# Patient Record
Sex: FEMALE | Race: WHITE | NOT HISPANIC OR LATINO | Employment: OTHER | URBAN - METROPOLITAN AREA
[De-identification: names, ages, dates, MRNs, and addresses within clinical notes are randomized per-mention and may not be internally consistent; named-entity substitution may affect disease eponyms.]

---

## 2017-04-24 RX ORDER — FUROSEMIDE 20 MG/1
20 TABLET ORAL DAILY
Qty: 90 TABLET | Refills: 2 | Status: SHIPPED | OUTPATIENT
Start: 2017-04-24 | End: 2017-09-27 | Stop reason: SDUPTHER

## 2017-09-27 ENCOUNTER — HOSPITAL ENCOUNTER (OUTPATIENT)
Dept: CARDIOLOGY | Facility: HOSPITAL | Age: 77
Discharge: HOME | End: 2017-09-27
Attending: INTERNAL MEDICINE | Admitting: INTERNAL MEDICINE

## 2017-09-27 ENCOUNTER — OFFICE VISIT (OUTPATIENT)
Dept: CARDIOLOGY | Facility: CLINIC | Age: 77
End: 2017-09-27

## 2017-09-27 VITALS
BODY MASS INDEX: 25.71 KG/M2 | HEIGHT: 64 IN | HEART RATE: 85 BPM | DIASTOLIC BLOOD PRESSURE: 96 MMHG | SYSTOLIC BLOOD PRESSURE: 137 MMHG | WEIGHT: 150.6 LBS

## 2017-09-27 VITALS — BODY MASS INDEX: 25.61 KG/M2 | HEIGHT: 64 IN | WEIGHT: 150 LBS

## 2017-09-27 DIAGNOSIS — I10 ESSENTIAL HYPERTENSION: Primary | ICD-10-CM

## 2017-09-27 DIAGNOSIS — E78.2 MIXED HYPERLIPIDEMIA: ICD-10-CM

## 2017-09-27 DIAGNOSIS — I06.1 RHEUMATIC AORTIC VALVE INSUFFICIENCY: ICD-10-CM

## 2017-09-27 DIAGNOSIS — I05.1 RHEUMATIC MITRAL REGURGITATION: ICD-10-CM

## 2017-09-27 DIAGNOSIS — R07.89 OTHER CHEST PAIN: Primary | ICD-10-CM

## 2017-09-27 PROBLEM — I35.1 NONRHEUMATIC AORTIC VALVE INSUFFICIENCY: Status: ACTIVE | Noted: 2017-09-27

## 2017-09-27 PROCEDURE — 99214 OFFICE O/P EST MOD 30 MIN: CPT | Performed by: INTERNAL MEDICINE

## 2017-09-27 PROCEDURE — 93306 TTE W/DOPPLER COMPLETE: CPT | Performed by: INTERNAL MEDICINE

## 2017-09-27 PROCEDURE — 93306 TTE W/DOPPLER COMPLETE: CPT

## 2017-09-27 RX ORDER — FUROSEMIDE 20 MG/1
20 TABLET ORAL DAILY
Qty: 90 TABLET | Refills: 3 | Status: SHIPPED | OUTPATIENT
Start: 2017-09-27 | End: 2018-10-02 | Stop reason: SDUPTHER

## 2017-09-28 LAB
BH CV ECHO MEAS - AI DEC SLOPE: 501.7 CM/SEC^2
BH CV ECHO MEAS - AI MAX PG: 75.4 MMHG
BH CV ECHO MEAS - AI MAX VEL: 434 CM/SEC
BH CV ECHO MEAS - AI P1/2T: 253.4 MSEC
BH CV ECHO MEAS - AO MAX PG (FULL): 3.6 MMHG
BH CV ECHO MEAS - AO MAX PG: 7.3 MMHG
BH CV ECHO MEAS - AO MEAN PG (FULL): 1.7 MMHG
BH CV ECHO MEAS - AO MEAN PG: 3.7 MMHG
BH CV ECHO MEAS - AO ROOT AREA (BSA CORRECTED): 1.8
BH CV ECHO MEAS - AO ROOT AREA: 7.1 CM^2
BH CV ECHO MEAS - AO ROOT DIAM: 3 CM
BH CV ECHO MEAS - AO V2 MAX: 134.7 CM/SEC
BH CV ECHO MEAS - AO V2 MEAN: 88.7 CM/SEC
BH CV ECHO MEAS - AO V2 VTI: 30.3 CM
BH CV ECHO MEAS - AVA(I,A): 2.4 CM^2
BH CV ECHO MEAS - AVA(I,D): 2.4 CM^2
BH CV ECHO MEAS - AVA(V,A): 2.2 CM^2
BH CV ECHO MEAS - AVA(V,D): 2.2 CM^2
BH CV ECHO MEAS - BSA(HAYCOCK): 1.8 M^2
BH CV ECHO MEAS - BSA: 1.7 M^2
BH CV ECHO MEAS - BZI_BMI: 26.6 KILOGRAMS/M^2
BH CV ECHO MEAS - BZI_METRIC_HEIGHT: 160 CM
BH CV ECHO MEAS - BZI_METRIC_WEIGHT: 68 KG
BH CV ECHO MEAS - CONTRAST EF (2CH): 69.2 ML/M^2
BH CV ECHO MEAS - CONTRAST EF 4CH: 60 ML/M^2
BH CV ECHO MEAS - EDV(CUBED): 55.3 ML
BH CV ECHO MEAS - EDV(MOD-SP2): 78 ML
BH CV ECHO MEAS - EDV(MOD-SP4): 70 ML
BH CV ECHO MEAS - EDV(TEICH): 62.3 ML
BH CV ECHO MEAS - EF(CUBED): 70.7 %
BH CV ECHO MEAS - EF(MOD-SP2): 69.2 %
BH CV ECHO MEAS - EF(MOD-SP4): 55 %
BH CV ECHO MEAS - EF(TEICH): 63.1 %
BH CV ECHO MEAS - ESV(CUBED): 16.2 ML
BH CV ECHO MEAS - ESV(MOD-SP2): 24 ML
BH CV ECHO MEAS - ESV(MOD-SP4): 28 ML
BH CV ECHO MEAS - ESV(TEICH): 23 ML
BH CV ECHO MEAS - FS: 33.6 %
BH CV ECHO MEAS - IVS/LVPW: 0.73
BH CV ECHO MEAS - IVSD: 0.58 CM
BH CV ECHO MEAS - LA DIMENSION: 2.6 CM
BH CV ECHO MEAS - LA/AO: 0.87
BH CV ECHO MEAS - LAT PEAK E' VEL: 9.8 CM/SEC
BH CV ECHO MEAS - LV DIASTOLIC VOL/BSA (35-75): 40.9 ML/M^2
BH CV ECHO MEAS - LV MASS(C)D: 70 GRAMS
BH CV ECHO MEAS - LV MASS(C)DI: 40.9 GRAMS/M^2
BH CV ECHO MEAS - LV MAX PG: 3.7 MMHG
BH CV ECHO MEAS - LV MEAN PG: 2 MMHG
BH CV ECHO MEAS - LV SYSTOLIC VOL/BSA (12-30): 16.4 ML/M^2
BH CV ECHO MEAS - LV V1 MAX: 95.9 CM/SEC
BH CV ECHO MEAS - LV V1 MEAN: 64.9 CM/SEC
BH CV ECHO MEAS - LV V1 VTI: 23.3 CM
BH CV ECHO MEAS - LVIDD: 3.8 CM
BH CV ECHO MEAS - LVIDS: 2.5 CM
BH CV ECHO MEAS - LVLD AP2: 7.9 CM
BH CV ECHO MEAS - LVLD AP4: 7.8 CM
BH CV ECHO MEAS - LVLS AP2: 6.1 CM
BH CV ECHO MEAS - LVLS AP4: 5.9 CM
BH CV ECHO MEAS - LVOT AREA (M): 3.1 CM^2
BH CV ECHO MEAS - LVOT AREA: 3.1 CM^2
BH CV ECHO MEAS - LVOT DIAM: 2 CM
BH CV ECHO MEAS - LVPWD: 0.79 CM
BH CV ECHO MEAS - MED PEAK E' VEL: 8.27 CM/SEC
BH CV ECHO MEAS - MV A MAX VEL: 129 CM/SEC
BH CV ECHO MEAS - MV E MAX VEL: 94.1 CM/SEC
BH CV ECHO MEAS - MV E/A: 0.73
BH CV ECHO MEAS - PA ACC SLOPE: 409 CM/SEC^2
BH CV ECHO MEAS - PA ACC TIME: 0.13 SEC
BH CV ECHO MEAS - PA PR(ACCEL): 18.7 MMHG
BH CV ECHO MEAS - RAP SYSTOLE: 3 MMHG
BH CV ECHO MEAS - RVDD: 2.8 CM
BH CV ECHO MEAS - RVSP: 28 MMHG
BH CV ECHO MEAS - SI(AO): 125 ML/M^2
BH CV ECHO MEAS - SI(CUBED): 22.9 ML/M^2
BH CV ECHO MEAS - SI(LVOT): 42.8 ML/M^2
BH CV ECHO MEAS - SI(MOD-SP2): 31.6 ML/M^2
BH CV ECHO MEAS - SI(MOD-SP4): 24.5 ML/M^2
BH CV ECHO MEAS - SI(TEICH): 23 ML/M^2
BH CV ECHO MEAS - SV(AO): 213.9 ML
BH CV ECHO MEAS - SV(CUBED): 39.1 ML
BH CV ECHO MEAS - SV(LVOT): 73.3 ML
BH CV ECHO MEAS - SV(MOD-SP2): 54 ML
BH CV ECHO MEAS - SV(MOD-SP4): 42 ML
BH CV ECHO MEAS - SV(TEICH): 39.3 ML
BH CV ECHO MEAS - TAPSE (>1.6): 2.5 CM2
BH CV ECHO MEAS - TR MAX VEL: 250 CM/SEC
BH CV VAS BP LEFT ARM: NORMAL MMHG
BH CV XLRA - TDI S': 12 CM/SEC
E/E' RATIO: 10.5
LEFT ATRIUM VOLUME INDEX: 25.1 ML/M2
LEFT ATRIUM VOLUME: 43 CM3
LV EF 2D ECHO EST: 60 %

## 2017-09-29 ENCOUNTER — TELEPHONE (OUTPATIENT)
Dept: CARDIOLOGY | Facility: CLINIC | Age: 77
End: 2017-09-29

## 2017-09-29 NOTE — TELEPHONE ENCOUNTER
Patient called about echo results.Informed her that her pumping function is normal and she does have some age related changes noted. She is fine to go on her trip and to f/u as scheduled.

## 2017-10-24 ENCOUNTER — LAB (OUTPATIENT)
Dept: LAB | Facility: HOSPITAL | Age: 77
End: 2017-10-24

## 2017-10-24 ENCOUNTER — TRANSCRIBE ORDERS (OUTPATIENT)
Dept: LAB | Facility: HOSPITAL | Age: 77
End: 2017-10-24

## 2017-10-24 DIAGNOSIS — Z01.812 PRE-OPERATIVE LABORATORY EXAMINATION: ICD-10-CM

## 2017-10-24 DIAGNOSIS — Z01.812 PRE-OPERATIVE LABORATORY EXAMINATION: Primary | ICD-10-CM

## 2017-10-24 LAB
ANION GAP SERPL CALCULATED.3IONS-SCNC: 5 MMOL/L (ref 3–11)
BUN BLD-MCNC: 12 MG/DL (ref 9–23)
BUN/CREAT SERPL: 15 (ref 7–25)
CALCIUM SPEC-SCNC: 9.7 MG/DL (ref 8.7–10.4)
CHLORIDE SERPL-SCNC: 106 MMOL/L (ref 99–109)
CO2 SERPL-SCNC: 31 MMOL/L (ref 20–31)
CREAT BLD-MCNC: 0.8 MG/DL (ref 0.6–1.3)
DEPRECATED RDW RBC AUTO: 43 FL (ref 37–54)
ERYTHROCYTE [DISTWIDTH] IN BLOOD BY AUTOMATED COUNT: 13.5 % (ref 11.3–14.5)
GFR SERPL CREATININE-BSD FRML MDRD: 70 ML/MIN/1.73
GLUCOSE BLD-MCNC: 84 MG/DL (ref 70–100)
HCT VFR BLD AUTO: 40.8 % (ref 34.5–44)
HGB BLD-MCNC: 13.1 G/DL (ref 11.5–15.5)
MCH RBC QN AUTO: 28.1 PG (ref 27–31)
MCHC RBC AUTO-ENTMCNC: 32.1 G/DL (ref 32–36)
MCV RBC AUTO: 87.4 FL (ref 80–99)
PLATELET # BLD AUTO: 286 10*3/MM3 (ref 150–450)
PMV BLD AUTO: 11.6 FL (ref 6–12)
POTASSIUM BLD-SCNC: 3.6 MMOL/L (ref 3.5–5.5)
RBC # BLD AUTO: 4.67 10*6/MM3 (ref 3.89–5.14)
SODIUM BLD-SCNC: 142 MMOL/L (ref 132–146)
WBC NRBC COR # BLD: 7.02 10*3/MM3 (ref 3.5–10.8)

## 2017-10-24 PROCEDURE — 36415 COLL VENOUS BLD VENIPUNCTURE: CPT

## 2017-10-24 PROCEDURE — 80048 BASIC METABOLIC PNL TOTAL CA: CPT | Performed by: SURGERY

## 2017-10-24 PROCEDURE — 85027 COMPLETE CBC AUTOMATED: CPT | Performed by: SURGERY

## 2017-10-30 ENCOUNTER — LAB REQUISITION (OUTPATIENT)
Dept: LAB | Facility: HOSPITAL | Age: 77
End: 2017-10-30

## 2017-10-30 DIAGNOSIS — D17.1 BENIGN LIPOMATOUS NEOPLASM OF SKIN AND SUBCUTANEOUS TISSUE OF TRUNK: ICD-10-CM

## 2017-10-30 PROCEDURE — 88304 TISSUE EXAM BY PATHOLOGIST: CPT | Performed by: SURGERY

## 2017-10-31 LAB
CYTO UR: NORMAL
LAB AP CASE REPORT: NORMAL
LAB AP CLINICAL INFORMATION: NORMAL
Lab: NORMAL
PATH REPORT.FINAL DX SPEC: NORMAL
PATH REPORT.GROSS SPEC: NORMAL

## 2018-04-23 ENCOUNTER — OFFICE VISIT (OUTPATIENT)
Dept: OBSTETRICS AND GYNECOLOGY | Facility: CLINIC | Age: 78
End: 2018-04-23

## 2018-04-23 ENCOUNTER — APPOINTMENT (OUTPATIENT)
Dept: LAB | Facility: HOSPITAL | Age: 78
End: 2018-04-23

## 2018-04-23 VITALS
BODY MASS INDEX: 24.41 KG/M2 | HEART RATE: 64 BPM | SYSTOLIC BLOOD PRESSURE: 118 MMHG | DIASTOLIC BLOOD PRESSURE: 66 MMHG | RESPIRATION RATE: 12 BRPM | HEIGHT: 64 IN | WEIGHT: 143 LBS | OXYGEN SATURATION: 97 %

## 2018-04-23 DIAGNOSIS — Z78.0 MENOPAUSE: Primary | ICD-10-CM

## 2018-04-23 DIAGNOSIS — N95.2 VAGINAL ATROPHY: ICD-10-CM

## 2018-04-23 DIAGNOSIS — R10.2 PELVIC PAIN: ICD-10-CM

## 2018-04-23 LAB
BILIRUB UR QL STRIP: NEGATIVE
CLARITY UR: CLEAR
COLOR UR: YELLOW
GLUCOSE UR STRIP-MCNC: NEGATIVE MG/DL
HGB UR QL STRIP.AUTO: NEGATIVE
KETONES UR QL STRIP: NEGATIVE
LEUKOCYTE ESTERASE UR QL STRIP.AUTO: NEGATIVE
NITRITE UR QL STRIP: NEGATIVE
PH UR STRIP.AUTO: 7 [PH] (ref 5–8)
PROT UR QL STRIP: NEGATIVE
SP GR UR STRIP: <=1.005 (ref 1–1.03)
UROBILINOGEN UR QL STRIP: NORMAL

## 2018-04-23 PROCEDURE — 99203 OFFICE O/P NEW LOW 30 MIN: CPT | Performed by: OBSTETRICS & GYNECOLOGY

## 2018-04-23 PROCEDURE — 81003 URINALYSIS AUTO W/O SCOPE: CPT | Performed by: OBSTETRICS & GYNECOLOGY

## 2018-04-23 RX ORDER — ESTRADIOL 0.1 MG/G
CREAM VAGINAL
Qty: 42.5 G | Refills: 2 | Status: SHIPPED | OUTPATIENT
Start: 2018-04-23 | End: 2019-10-23

## 2018-04-23 NOTE — PROGRESS NOTES
Chief Complaint   Patient presents with   • Gynecologic Exam       Jessie Perez is a 77 y.o. year old  presenting to be seen for the acute onset of pelvic pain and bladder pressure.  This patient had a vaginal hysterectomy performed by Dr. Orellana.  She has also had bilateral, modified radical mastectomies of the breast for carcinoma of the left breast.  She states that she has been lifting her .  She denies bulging from the vagina.  She has discomfort when she voids.  She denies bowel symptoms.  She denies vaginal burning    History   Sexual Activity   • Sexual activity: Defer     SCREENING TESTS  No mammograms or Pap tests  Year 2012   Age                         PAP                         HPV high risk                         Mammogram                         BHUPINDER score                         Breast MRI                         Lipids                         Vitamin D                         Colonoscopy                         DEXA  Frax (hip/any)                         Ovarian Screen                             No Additional Complaints Reported    The following portions of the patient's history were reviewed and updated as appropriate:vital signs and   She  does not have any pertinent problems on file.  She  has a past surgical history that includes Cardiac catheterization; Hysterectomy; and Breast biopsy.  Her family history includes Cancer in her father; Hypertension in her mother; Stroke in her father.  She  reports that she has never smoked. She has never used smokeless tobacco. She reports that she does not drink alcohol or use drugs.  Current Outpatient Prescriptions   Medication Sig Dispense Refill   • aspirin 325 MG tablet Take 325 mg by mouth daily.     • Calcium-Phosphorus-Vitamin D (CITRACAL +D3 PO) Take 1,000 mg by mouth daily.     • Cranberry 500 MG capsule Take 500 mg by  "mouth daily.     • furosemide (LASIX) 20 MG tablet Take 1 tablet by mouth Daily. 90 tablet 3   • Multiple Vitamins-Minerals (CENTRUM ADULTS PO) Take  by mouth daily.     • omeprazole (PriLOSEC) 20 MG capsule Take 20 mg by mouth daily.     • HYDROcodone-acetaminophen (NORCO) 5-325 MG per tablet Take 1 tablet by mouth Every 4-6 Hours As Needed for pain 15 tablet 0     No current facility-administered medications for this visit.      She is allergic to bactrim [sulfamethoxazole-trimethoprim]; ciprofloxacin; lortab [hydrocodone-acetaminophen]; and oxycontin [oxycodone hcl]..        Review of Systems  A comprehensive review of systems was done.  Constitutional: negative for fever, chills, activity change, appetite change, fatigue and unexpected weight change.  Respiratory: negative  Cardiovascular: positive for palpitations  Gastrointestinal: negative  Genitourinary:positive for dysuria and frequency  Musculoskeletal:negative  Behavioral/Psych: negative          /66   Pulse 64   Resp 12   Ht 161.3 cm (63.5\")   Wt 64.9 kg (143 lb)   SpO2 97%   BMI 24.93 kg/m²     Physical Exam    General:  alert; cooperative; well developed; well nourished   Skin:  No suspicious lesions seen   Thyroid: normal to inspection and palpation   Lungs:  clear to auscultation bilaterally   Heart:  RRR with II/VI systolic murmur along LSB   Breasts:  Examined in supine position  Absent bilaterally with no nick enlargement   Abdomen: soft, non-tender; no masses  no umbilical or inginual hernias are present  no hepato-splenomegaly   Pelvis: Clinical staff was present for exam  External genitalia:  normal appearance of the external genitalia including Bartholin's and [place]'s glands.  Vaginal:  atrophic mucosal changes are present;  Cervix:  absent.  Uterus:  absent.  Adnexa:  non palpable bilaterally.  Rectal:  anus visually normal appearing. recto-vaginal exam unremarkable and confirms findings;     Lab Review   No data " reviewed    Imaging   No data reviewed    Medical counseling was given to patient for the following topics: diagnostic results, instructions for management, risk factor reductions, impressions, risks and benefits of treatment options and importance of treatment compliance . Total time of the encounter was 32 minute(s) and 19 minute(s)  was spent in face to face counseling.  I have counseled the patient that she has evidence of vaginal atrophy and should be using Estrace vaginal cream in a dose of 0.5 g twice a week.  I have counseled the patient that her bladder is tender to palpation.  I have counseled her about the need for a catheterized urine specimen.  I have counseled her that if the urinalysis is normal she should see urology for follow-up.  I have counseled her to avoid heavy lifting and straining.  I have counseled her about regular walking and taking calcium with vitamin D.          ASSESSMENT  Problems Addressed this Visit        Genitourinary    Menopause - Primary      Other Visit Diagnoses     Pelvic pain        Relevant Orders    Urinalysis With / Culture If Indicated - Urine, Catheter           Vaginal atrophy      PLAN    · Medications prescribed this encounter:  No orders of the defined types were placed in this encounter.  · Estrace vaginal cream, 0.5 grams twice a week  · Cath U/A sent, If the urinalysis is normal I have commended that the patient see urology for bladder pain  · Follow up: 12 month(s)  · Calcium, 600 mg/ Vit. D, 400 IU daily     *Please note that portions of this documentation may have been completed with a voice recognition program.  Efforts were made to edit this dictation, but occasional words may have been mistranscribed.     This note was electronically signed.    LEIGHTON Allen MD  April 23, 2018  2:11 PM

## 2018-04-24 ENCOUNTER — TELEPHONE (OUTPATIENT)
Dept: OBSTETRICS AND GYNECOLOGY | Facility: CLINIC | Age: 78
End: 2018-04-24

## 2018-04-24 NOTE — TELEPHONE ENCOUNTER
"Pt notified that her insurance is requiring we change RX from Estrace Vag cream to Premarin but that the cost of the medication will be $363.88 per tube. Explained that the tube will last several months and cost will go toward her deductible, versus getting generic Estrace cream which would cost $5 less, but not go toward her med deductible. She voices understanding and is aggreeable.     I spoke with \"Magda\" pharmacist at [facility] - RX changed to Premarin vaginal Cream, insert 0.5 gram intravaginally TWO times a week at night. Refills x2.   "

## 2018-10-02 RX ORDER — FUROSEMIDE 20 MG/1
20 TABLET ORAL DAILY
Qty: 90 TABLET | Refills: 0 | Status: SHIPPED | OUTPATIENT
Start: 2018-10-02 | End: 2018-10-10 | Stop reason: SDUPTHER

## 2018-10-08 NOTE — PROGRESS NOTES
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    Chief Complaint   Patient presents with   • Rheumatic aortic valve insufficiency   • Hypertension       PROBLEM LIST:   1. Hypertension.   2. Hyperlipidemia.   3. VHD  1. 5/16 echo bileaflet MVP mod mr, mild AI nl lvef  2. 9/20/17 echo: EF 60%,  mild MR, mild AI  4. Rheumatic fever as a child.  5. Osteoporosis  6. Hiatal Hernia  7. Hx breast ca  8. CP- 4/16 MPS wnl  9. Surgical Hx  1. Double mastectomy  2. Cataract extraction 2017    Allergies  Allergies   Allergen Reactions   • Bactrim [Sulfamethoxazole-Trimethoprim] Angioedema   • Ciprofloxacin    • Lortab [Hydrocodone-Acetaminophen]    • Oxycontin [Oxycodone Hcl]        Current Medications    Current Outpatient Prescriptions:   •  aspirin 325 MG tablet, Take 325 mg by mouth daily., Disp: , Rfl:   •  Calcium-Phosphorus-Vitamin D (CITRACAL +D3 PO), Take 1,000 mg by mouth daily., Disp: , Rfl:   •  Cranberry 500 MG capsule, Take 500 mg by mouth daily., Disp: , Rfl:   •  estradiol (ESTRACE VAGINAL) 0.1 MG/GM vaginal cream, Insert 0.5 grams intravaginally 2 times each week, Disp: 42.5 g, Rfl: 2  •  furosemide (LASIX) 20 MG tablet, Take 1 tablet by mouth Daily., Disp: 90 tablet, Rfl: 0  •  meclizine (ANTIVERT) 12.5 MG tablet, Take 1 tablet by mouth As Needed., Disp: , Rfl:   •  Multiple Vitamins-Minerals (CENTRUM ADULTS PO), Take 1 tablet by mouth Daily., Disp: , Rfl:   •  omeprazole (PriLOSEC) 20 MG capsule, Take 20 mg by mouth daily., Disp: , Rfl:       History of Present Illness   Pt here for follow up. . She reports she feels these symptoms are related to stress of being her 's caretaker.  Has had substernal chest  "discomfort the clinic curve nocturnally she has taken aspirin with relief of such.  She's been on PPI for some time with out any heartburn symptoms.  She can notice occasionally with exercise doing housework as well.  ROS:  All systems have been reviewed and are negative with the exception of those mentioned in the HPI.    OBJECTIVE:  Vitals:    10/10/18 0927   BP: 148/82   BP Location: Left arm   Patient Position: Sitting   Pulse: 62   SpO2: 98%   Weight: 69.1 kg (152 lb 6.4 oz)   Height: 160 cm (63\")     Physical Exam   Constitutional: She appears well-developed and well-nourished.   Neck: Normal range of motion. Neck supple. No hepatojugular reflux and no JVD present. Carotid bruit is not present. No tracheal deviation present. No thyromegaly present.   Cardiovascular: Normal rate, regular rhythm, S1 normal, S2 normal, intact distal pulses and normal pulses.  PMI is not displaced.  Exam reveals no gallop, no distant heart sounds, no friction rub, no midsystolic click and no opening snap.    Murmur heard.  Pulses:       Radial pulses are 2+ on the right side, and 2+ on the left side.        Dorsalis pedis pulses are 2+ on the right side, and 2+ on the left side.        Posterior tibial pulses are 2+ on the right side, and 2+ on the left side.   Pulmonary/Chest: Effort normal and breath sounds normal. She has no wheezes. She has no rales.   Abdominal: Soft. Bowel sounds are normal. She exhibits no mass. There is no tenderness. There is no guarding.       Diagnostic Data:  Procedures      ASSESSMENT:   Diagnosis Plan   1. Angina pectoris (CMS/HCC)     2. Essential hypertension       PLAN:  1.  Anginal equivalent no recent ischemic evaluation risk stratification with exercise Cardiolite at her nearest convenience  2. Mildly elevated today with occasional lower readings at home. Pt instructed to check BP at home more often and if readings are averaging too high then would consider addition of antihypertensive " agent  3. Labs per PCP, continue risk factor reduction  4.  Lipid profile day of stress testing if not obtained recently per Dr. Stevie Hubbard, Stephan Alcantar MD, thank you for referring Ms. Perez for evaluation.  I have forwarded my electronically generated recommendations to you for review.  Please do not hesitate to call with any questions.    Scribed for aRo Wei MD by Megan Luo PA-C. 10/10/2018  9:44 AM   I, Rao Wei MD, personally performed the services described in this documentation as scribed by the above named individual in my presence, and it is both accurate and complete.  10/10/2018  9:44 AM    Rao Wei MD, FACC

## 2018-10-10 ENCOUNTER — OFFICE VISIT (OUTPATIENT)
Dept: CARDIOLOGY | Facility: CLINIC | Age: 78
End: 2018-10-10

## 2018-10-10 VITALS
DIASTOLIC BLOOD PRESSURE: 82 MMHG | SYSTOLIC BLOOD PRESSURE: 148 MMHG | HEART RATE: 62 BPM | BODY MASS INDEX: 27 KG/M2 | OXYGEN SATURATION: 98 % | WEIGHT: 152.4 LBS | HEIGHT: 63 IN

## 2018-10-10 DIAGNOSIS — I10 ESSENTIAL HYPERTENSION: ICD-10-CM

## 2018-10-10 DIAGNOSIS — I20.9 ANGINA PECTORIS (HCC): Primary | ICD-10-CM

## 2018-10-10 PROCEDURE — 99214 OFFICE O/P EST MOD 30 MIN: CPT | Performed by: INTERNAL MEDICINE

## 2018-10-10 RX ORDER — MECLIZINE HCL 12.5 MG/1
1 TABLET ORAL AS NEEDED
COMMUNITY
Start: 2018-07-24 | End: 2021-04-28

## 2018-10-10 RX ORDER — FUROSEMIDE 20 MG/1
20 TABLET ORAL DAILY
Qty: 90 TABLET | Refills: 0 | Status: SHIPPED | OUTPATIENT
Start: 2018-10-10 | End: 2019-06-17 | Stop reason: SDUPTHER

## 2018-10-23 ENCOUNTER — HOSPITAL ENCOUNTER (OUTPATIENT)
Dept: CARDIOLOGY | Facility: HOSPITAL | Age: 78
Discharge: HOME | End: 2018-10-23
Attending: INTERNAL MEDICINE

## 2018-10-23 VITALS — WEIGHT: 152.34 LBS | BODY MASS INDEX: 26.99 KG/M2 | HEIGHT: 63 IN

## 2018-10-23 DIAGNOSIS — I20.9 ANGINA PECTORIS (HCC): ICD-10-CM

## 2018-10-23 LAB
BH CV STRESS DURATION MIN STAGE 1: 1
BH CV STRESS DURATION SEC STAGE 1: 32
BH CV STRESS GRADE STAGE 1: 10
BH CV STRESS METS STAGE 1: 5
BH CV STRESS PROTOCOL 1: NORMAL
BH CV STRESS PROTOCOL 2 BP STAGE 2: NORMAL
BH CV STRESS PROTOCOL 2 BP STAGE 3: 105
BH CV STRESS PROTOCOL 2 BP STAGE 4: 103
BH CV STRESS PROTOCOL 2 COMMENTS STAGE 1: NORMAL
BH CV STRESS PROTOCOL 2 DOSE REGADENOSON STAGE 1: 0.4
BH CV STRESS PROTOCOL 2 DURATION MIN STAGE 1: 1
BH CV STRESS PROTOCOL 2 DURATION MIN STAGE 2: 1
BH CV STRESS PROTOCOL 2 DURATION MIN STAGE 3: 1
BH CV STRESS PROTOCOL 2 DURATION MIN STAGE 4: 1
BH CV STRESS PROTOCOL 2 DURATION SEC STAGE 1: 0
BH CV STRESS PROTOCOL 2 DURATION SEC STAGE 2: 0
BH CV STRESS PROTOCOL 2 DURATION SEC STAGE 3: 0
BH CV STRESS PROTOCOL 2 DURATION SEC STAGE 4: 0
BH CV STRESS PROTOCOL 2 HR STAGE 1: 117
BH CV STRESS PROTOCOL 2 HR STAGE 2: 110
BH CV STRESS PROTOCOL 2 HR STAGE 4: NORMAL
BH CV STRESS PROTOCOL 2 O2 STAGE 2: 99
BH CV STRESS PROTOCOL 2 O2 STAGE 3: 98
BH CV STRESS PROTOCOL 2 O2 STAGE 4: 98
BH CV STRESS PROTOCOL 2 STAGE 1: 1
BH CV STRESS PROTOCOL 2 STAGE 2: 2
BH CV STRESS PROTOCOL 2 STAGE 3: 3
BH CV STRESS PROTOCOL 2 STAGE 4: 4
BH CV STRESS PROTOCOL 2: NORMAL
BH CV STRESS RECOVERY BP: NORMAL MMHG
BH CV STRESS RECOVERY HR: 94 BPM
BH CV STRESS RECOVERY O2: 98 %
BH CV STRESS SPEED STAGE 1: 1.7
BH CV STRESS STAGE 1: 1
LV EF NUC BP: 74 %
MAXIMAL PREDICTED HEART RATE: 142 BPM
PERCENT MAX PREDICTED HR: 106.34 %
STRESS BASELINE BP: NORMAL MMHG
STRESS BASELINE HR: 71 BPM
STRESS O2 SAT REST: 97 %
STRESS PERCENT HR: 125 %
STRESS POST ESTIMATED WORKLOAD: 1 METS
STRESS POST EXERCISE DUR MIN: 4 MIN
STRESS POST EXERCISE DUR SEC: 0 SEC
STRESS POST O2 SAT PEAK: 99 %
STRESS POST PEAK BP: NORMAL MMHG
STRESS POST PEAK HR: 151 BPM
STRESS TARGET HR: 121 BPM

## 2018-10-23 PROCEDURE — 0 TECHNETIUM SESTAMIBI: Performed by: INTERNAL MEDICINE

## 2018-10-23 PROCEDURE — 93018 CV STRESS TEST I&R ONLY: CPT | Performed by: INTERNAL MEDICINE

## 2018-10-23 PROCEDURE — 78452 HT MUSCLE IMAGE SPECT MULT: CPT

## 2018-10-23 PROCEDURE — A9500 TC99M SESTAMIBI: HCPCS | Performed by: INTERNAL MEDICINE

## 2018-10-23 PROCEDURE — 25010000002 REGADENOSON 0.4 MG/5ML SOLUTION: Performed by: INTERNAL MEDICINE

## 2018-10-23 PROCEDURE — 93017 CV STRESS TEST TRACING ONLY: CPT

## 2018-10-23 PROCEDURE — 78452 HT MUSCLE IMAGE SPECT MULT: CPT | Performed by: INTERNAL MEDICINE

## 2018-10-23 RX ADMIN — TECHNETIUM TC 99M SESTAMIBI 1 DOSE: 1 INJECTION INTRAVENOUS at 11:10

## 2018-10-23 RX ADMIN — TECHNETIUM TC 99M SESTAMIBI 1 DOSE: 1 INJECTION INTRAVENOUS at 12:50

## 2018-10-23 RX ADMIN — REGADENOSON 0.4 MG: 0.08 INJECTION, SOLUTION INTRAVENOUS at 12:50

## 2018-10-26 ENCOUNTER — TELEPHONE (OUTPATIENT)
Dept: CARDIOLOGY | Facility: CLINIC | Age: 78
End: 2018-10-26

## 2018-10-26 NOTE — TELEPHONE ENCOUNTER
Spoke to patient reviewed stress results and informed her I will be mailing her a letter today as well. Patient verbalized understanding.

## 2019-05-08 RX ORDER — CONJUGATED ESTROGENS 0.62 MG/G
CREAM VAGINAL
Refills: 1 | OUTPATIENT
Start: 2019-05-08

## 2019-06-17 RX ORDER — FUROSEMIDE 20 MG/1
20 TABLET ORAL DAILY
Qty: 180 TABLET | Refills: 0 | Status: SHIPPED | OUTPATIENT
Start: 2019-06-17 | End: 2020-10-28 | Stop reason: SDUPTHER

## 2019-10-23 ENCOUNTER — OFFICE VISIT (OUTPATIENT)
Dept: CARDIOLOGY | Facility: CLINIC | Age: 79
End: 2019-10-23

## 2019-10-23 VITALS
OXYGEN SATURATION: 97 % | BODY MASS INDEX: 26.26 KG/M2 | SYSTOLIC BLOOD PRESSURE: 140 MMHG | HEIGHT: 64 IN | DIASTOLIC BLOOD PRESSURE: 72 MMHG | HEART RATE: 70 BPM | WEIGHT: 153.8 LBS

## 2019-10-23 DIAGNOSIS — R07.2 PRECORDIAL PAIN: Primary | ICD-10-CM

## 2019-10-23 DIAGNOSIS — E78.2 MIXED HYPERLIPIDEMIA: ICD-10-CM

## 2019-10-23 DIAGNOSIS — I10 ESSENTIAL HYPERTENSION: ICD-10-CM

## 2019-10-23 PROCEDURE — 99214 OFFICE O/P EST MOD 30 MIN: CPT | Performed by: INTERNAL MEDICINE

## 2019-10-23 RX ORDER — CHOLECALCIFEROL (VITAMIN D3) 125 MCG
500 CAPSULE ORAL DAILY
COMMUNITY
End: 2020-01-16

## 2019-10-23 RX ORDER — ASPIRIN 81 MG/1
81 TABLET ORAL DAILY
COMMUNITY

## 2019-10-23 NOTE — PROGRESS NOTES
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      PROBLEM LIST:   1. Hypertension.   2. Hyperlipidemia.   3. VHD  1. 5/16 echo bileaflet MVP mod mr, mild AI nl lvef  2. 9/20/17 echo: EF 60%,  mild MR, mild AI  4. CP  1. 10/23/2018 MPS: Lexiscan Cardiolite with moderate sized fixed apical defect with no reversibility EF>70%  5. Rheumatic fever as a child.  6. Osteoporosis  7. Hiatal Hernia  8. Hx breast ca  9. CP- 4/16 MPS wnl  10. Surgical Hx  1. Double mastectomy  2. Cataract extraction 2017      Chief Complaint   Patient presents with   • Chest Pain   • Essential hypertension   • Mitral regurgitation       Allergies  Allergies   Allergen Reactions   • Bactrim [Sulfamethoxazole-Trimethoprim] Angioedema   • Ciprofloxacin    • Lortab [Hydrocodone-Acetaminophen]    • Oxycontin [Oxycodone Hcl]        Current Medications    Current Outpatient Medications:   •  aspirin 81 MG EC tablet, Take 81 mg by mouth Daily., Disp: , Rfl:   •  Calcium-Phosphorus-Vitamin D (CITRACAL +D3 PO), Take 1,000 mg by mouth daily., Disp: , Rfl:   •  Cranberry 500 MG capsule, Take 500 mg by mouth daily., Disp: , Rfl:   •  furosemide (LASIX) 20 MG tablet, Take 1 tablet by mouth Daily. (Patient taking differently: Take 20 mg by mouth As Needed.), Disp: 180 tablet, Rfl: 0  •  meclizine (ANTIVERT) 12.5 MG tablet, Take 1 tablet by mouth As Needed., Disp: , Rfl:   •  Multiple Vitamins-Minerals (CENTRUM ADULTS PO), Take 1 tablet by mouth Daily., Disp: , Rfl:   •  omeprazole (PriLOSEC) 20 MG capsule, Take 20 mg by mouth daily., Disp: , Rfl:   •  vitamin B-12 (CYANOCOBALAMIN) 500 MCG tablet, Take 500 mcg by mouth Daily., Disp: , Rfl:       History of Present Illness   Jessie A  "Chris is a 79 y.o. year old female here for follow up.    She reports she continues to have midsternal CP daily. This can radiate to her back. She states it reminds her of having muscle spasms in her legs. Sometimes this occurs after eating, and she has reported some dysphagia as well. Last December she had EGD w esophageal dilation w [facility].      She does notice ARAIZA w moderate exertion. Her 's health is deteriorating, and she is not able to get formal exercise. Is having more help with her kids.     Pt denies any dyspnea at rest, orthopnea, PND, palpitations, lower extremity edema, or claudication.    Her BP has been controlled.      OBJECTIVE:  Vitals:    10/23/19 0932   BP: 140/72   BP Location: Left arm   Patient Position: Sitting   Pulse: 70   SpO2: 97%   Weight: 69.8 kg (153 lb 12.8 oz)   Height: 161.3 cm (63.5\")     Physical Exam   Constitutional: She is oriented to person, place, and time. She appears well-developed and well-nourished. No distress.   Cardiovascular: Normal rate, regular rhythm and intact distal pulses.   Murmur heard.  Pulses:       Radial pulses are 2+ on the right side, and 2+ on the left side.        Dorsalis pedis pulses are 2+ on the right side, and 2+ on the left side.        Posterior tibial pulses are 2+ on the right side, and 2+ on the left side.   Pulmonary/Chest: Effort normal and breath sounds normal. She has no wheezes. She has no rales.   Abdominal: Soft. Bowel sounds are normal. There is no tenderness. There is no guarding.   Musculoskeletal: She exhibits no edema or tenderness.   Neurological: She is alert and oriented to person, place, and time.   Skin: Skin is warm and dry. No rash noted.   Psychiatric: She has a normal mood and affect.   Nursing note and vitals reviewed.      Diagnostic Data:  Procedures      ASSESSMENT:   Diagnosis Plan   1. Precordial pain     2. Essential hypertension     3. Mixed hyperlipidemia         PLAN:  1. Low risk stress last " year. Agree with further GI w/u with her history. Also rec to try mylanta. Consider gallbladder u/s.  2. Patient has acceptable BP. Continue current medical management. Counseled to regularly check BP at home with goal averaging <130/80.   3. Labs per pcp, diet controlled     StevieStephan MD, thank you for referring Ms. Perez for evaluation.  I have forwarded my electronically generated recommendations to you for review.  Please do not hesitate to call with any questions.    Scribed for Rao Wei MD by Megan Luo PA-C. 10/23/2019  9:53 AM   Rao Wei MD, FACC

## 2019-12-23 ENCOUNTER — OUTSIDE FACILITY SERVICE (OUTPATIENT)
Dept: GASTROENTEROLOGY | Facility: CLINIC | Age: 79
End: 2019-12-23

## 2019-12-23 PROCEDURE — 45378 DIAGNOSTIC COLONOSCOPY: CPT | Performed by: INTERNAL MEDICINE

## 2019-12-26 DIAGNOSIS — R10.9 ABDOMINAL PAIN, UNSPECIFIED ABDOMINAL LOCATION: Primary | ICD-10-CM

## 2019-12-27 ENCOUNTER — HOSPITAL ENCOUNTER (OUTPATIENT)
Dept: CT IMAGING | Facility: HOSPITAL | Age: 79
Discharge: HOME | End: 2019-12-27
Admitting: INTERNAL MEDICINE

## 2019-12-27 DIAGNOSIS — R10.9 ABDOMINAL PAIN, UNSPECIFIED ABDOMINAL LOCATION: ICD-10-CM

## 2019-12-27 PROCEDURE — 74176 CT ABD & PELVIS W/O CONTRAST: CPT

## 2019-12-27 PROCEDURE — A9270 NON-COVERED ITEM OR SERVICE: HCPCS | Performed by: INTERNAL MEDICINE

## 2019-12-27 PROCEDURE — 63710000001 BARIUM 2 % SUSPENSION: Performed by: INTERNAL MEDICINE

## 2019-12-27 RX ADMIN — BARIUM SULFATE 450 ML: 21 SUSPENSION ORAL at 12:12

## 2020-01-02 ENCOUNTER — TELEPHONE (OUTPATIENT)
Dept: OBSTETRICS AND GYNECOLOGY | Facility: CLINIC | Age: 80
End: 2020-01-02

## 2020-01-02 NOTE — TELEPHONE ENCOUNTER
Patient calls today wondering if her appendix was taken out at the time of post partum bilateral segmental salpingectomy by Dr. Hauser?  I told the patient that there was no record of that procedure being done in her current record, and that it would have been unusual to remove her appendix at the time of a tubal.  I let her know that we could request her chart from storage if necessary, and she states that it should not be necessary at this time.  She has not seen Dr. Allen since 4/23/18.  She requested a prescription for Premarin vaginal cream.  I told her that she must see Dr. Allen before we could prescribe any medications for her.  She was scheduled to see Dr. Allen 1/16/2020.

## 2020-01-06 ENCOUNTER — OFFICE VISIT (OUTPATIENT)
Dept: GASTROENTEROLOGY | Facility: CLINIC | Age: 80
End: 2020-01-06

## 2020-01-06 VITALS
BODY MASS INDEX: 26.68 KG/M2 | SYSTOLIC BLOOD PRESSURE: 158 MMHG | HEART RATE: 61 BPM | DIASTOLIC BLOOD PRESSURE: 71 MMHG | WEIGHT: 153 LBS

## 2020-01-06 DIAGNOSIS — R10.30 LOWER ABDOMINAL PAIN: Primary | ICD-10-CM

## 2020-01-06 PROCEDURE — 99213 OFFICE O/P EST LOW 20 MIN: CPT | Performed by: INTERNAL MEDICINE

## 2020-01-06 NOTE — PROGRESS NOTES
PCP:  Stephan Hubbard MD     No referring provider defined for this encounter.    Chief Complaint   Patient presents with   • Abdominal Pain     follow up abdominal pain        HPI   Patient is known to me.  A 79-year-old who had a recent colonoscopy.  And some lower abdominal discomfort.  She had some fixation of the colon during colonoscopy.  There was also some diverticulosis with an area of white exudate concerning for diverticulitis.  There was no associated inflammation however.  A subsequent CAT scan was fairly unremarkable other than a 4 mm noncalcified nodule in the right middle lung field.  No diverticulitis was noted.  She had some worsening of her pain until about January 1.  Since that time her pain has almost completely dissipated.  She did have an issue with some constipation which is unusual for her.  She had that when the pain initially began.  Her bowels seem to have also returned.    Allergies   Allergen Reactions   • Bactrim [Sulfamethoxazole-Trimethoprim] Angioedema   • Ciprofloxacin    • Lortab [Hydrocodone-Acetaminophen]    • Oxycontin [Oxycodone Hcl]           Current Outpatient Medications:   •  aspirin 81 MG EC tablet, Take 81 mg by mouth Daily., Disp: , Rfl:   •  Calcium-Phosphorus-Vitamin D (CITRACAL +D3 PO), Take 1,000 mg by mouth daily., Disp: , Rfl:   •  Cranberry 500 MG capsule, Take 500 mg by mouth daily., Disp: , Rfl:   •  furosemide (LASIX) 20 MG tablet, Take 1 tablet by mouth Daily. (Patient taking differently: Take 20 mg by mouth As Needed.), Disp: 180 tablet, Rfl: 0  •  meclizine (ANTIVERT) 12.5 MG tablet, Take 1 tablet by mouth As Needed., Disp: , Rfl:   •  Multiple Vitamins-Minerals (CENTRUM ADULTS PO), Take 1 tablet by mouth Daily., Disp: , Rfl:   •  omeprazole (PriLOSEC) 20 MG capsule, Take 20 mg by mouth daily., Disp: , Rfl:   •  polyethylene glycol (GoLYTELY) 236 g solution, Follow Directions On Paperwork Mailed From Office. If You Have Questions Call [phone].,  Disp: 4000 mL, Rfl: 0  •  vitamin B-12 (CYANOCOBALAMIN) 500 MCG tablet, Take 500 mcg by mouth Daily., Disp: , Rfl:      Past Medical History:   Diagnosis Date   • Chest pain 9/7/2016   • History of echocardiogram     EKG done today shows normal sinus rhythm with no ST-T wave changes with a rate of 61 and a QTc of 404 msec.    • History of rheumatic fever 9/7/2016   • Hyperlipidemia 9/7/2016   • Hypertension 9/7/2016   • Mitral regurgitation 9/7/2016   • Swelling of extremity, left 9/7/2016       Past Surgical History:   Procedure Laterality Date   • BREAST BIOPSY     • CARDIAC CATHETERIZATION     • HYSTERECTOMY          Social History     Socioeconomic History   • Marital status:      Spouse name: Not on file   • Number of children: Not on file   • Years of education: Not on file   • Highest education level: Not on file   Tobacco Use   • Smoking status: Never Smoker   • Smokeless tobacco: Never Used   Substance and Sexual Activity   • Alcohol use: No   • Drug use: No   • Sexual activity: Defer        Family History   Problem Relation Age of Onset   • Hypertension Mother    • Stroke Mother         5 strokes   • Cancer Father    • Stroke Father         Review of Systems   Constitutional: Negative.    HENT: Negative for trouble swallowing and voice change.    Gastrointestinal: Negative.         Vitals:    01/06/20 1332   BP: 158/71   Pulse: 61        Physical Exam   General Appearance: Alert, in no acute distress   Head: Normocephalic, without obvious abnormality, atraumatic   Eyes: Lids and lashes normal, conjunctivae and sclerae normal, no icterus, no pallor, corneas clear, PERRLA   Ears: Ears appear intact with no abnormalities noted   Extremities: Moves all extremities well, no edema, no cyanosis, no redness   Skin: No bleeding, bruising or rash   Neurologic: Cranial nerves 2 - 12 grossly intact, no focal deficits     Review of systems was reviewed and positives are noted. All of the remaining review of  systems in that system are negative.    Jessie was seen today for abdominal pain.    Diagnoses and all orders for this visit:    Lower abdominal pain    Impressions and plan #1 lower abdominal discomfort: In listening to her situation: One wonders if she had an episode of diverticulitis.  This was in early November.  She subsequently had constipation at that time which is not unusual in patients with diverticulitis.  I presume this is due to edema and inflammation.  She then had a smoldering course.  When we did her colonoscopy there was some exudate that look like pus but no significant inflammatory change.  Since then she appears to have improved dramatically.  She had some urinary complaints as well and sometimes I see that in patients with diverticulitis as well.  The sigmoid colon often will abut the bladder and sometimes can give urinary symptoms as well.  Putting this altogether, it is possible she had an episode of diverticulitis that just took longer than usual to resolve.  We will see her back in 4 to 6 weeks.  Hopefully she will be having any more difficulties.  At that point we can decide if we want a reevaluate the colon with a colonoscopy or barium enema.  She may not want a evaluated her age if she is doing well.  I reviewed her CAT scan with her.    Rao Navarro MD

## 2020-01-16 ENCOUNTER — OFFICE VISIT (OUTPATIENT)
Dept: OBSTETRICS AND GYNECOLOGY | Facility: CLINIC | Age: 80
End: 2020-01-16

## 2020-01-16 VITALS
WEIGHT: 154.2 LBS | DIASTOLIC BLOOD PRESSURE: 86 MMHG | SYSTOLIC BLOOD PRESSURE: 146 MMHG | HEIGHT: 64 IN | BODY MASS INDEX: 26.32 KG/M2

## 2020-01-16 DIAGNOSIS — Z01.419 ENCOUNTER FOR GYNECOLOGICAL EXAMINATION WITHOUT ABNORMAL FINDING: ICD-10-CM

## 2020-01-16 DIAGNOSIS — N95.2 VAGINAL ATROPHY: ICD-10-CM

## 2020-01-16 DIAGNOSIS — Z78.0 MENOPAUSE: Primary | ICD-10-CM

## 2020-01-16 PROCEDURE — G0101 CA SCREEN;PELVIC/BREAST EXAM: HCPCS | Performed by: OBSTETRICS & GYNECOLOGY

## 2020-01-16 RX ORDER — INFLUENZA VACCINE, ADJUVANTED 15; 15; 15 UG/.5ML; UG/.5ML; UG/.5ML
INJECTION, SUSPENSION INTRAMUSCULAR
Refills: 0 | COMMUNITY
Start: 2019-10-09 | End: 2021-04-28

## 2020-01-16 RX ORDER — PNEUMOCOCCAL 13-VALENT CONJUGATE VACCINE 2.2; 2.2; 2.2; 2.2; 2.2; 4.4; 2.2; 2.2; 2.2; 2.2; 2.2; 2.2; 2.2 UG/.5ML; UG/.5ML; UG/.5ML; UG/.5ML; UG/.5ML; UG/.5ML; UG/.5ML; UG/.5ML; UG/.5ML; UG/.5ML; UG/.5ML; UG/.5ML; UG/.5ML
INJECTION, SUSPENSION INTRAMUSCULAR
Refills: 0 | COMMUNITY
Start: 2019-10-09 | End: 2021-04-28

## 2020-01-16 NOTE — PROGRESS NOTES
Chief Complaint   Patient presents with   • Gynecologic Exam   • Med Refill     Premarin vaginal cream or compounded estradiol vaginal cream       Jessie Perez is a 79 y.o. year old  presenting to be seen for her annual exam.  This patient is menopausal and is previously had tubal sterilization and a vaginal hysterectomy by Dr. Hauser.  She presented for my care with severe vaginal atrophy, burning and itching.  She was treated with Premarin vaginal cream, 0.5 g twice a week and had complete relief of symptoms.  She discontinued this medication 3 months ago on her own.  She is now symptomatic again.  She has some constipation.  She has some left lower quadrant pain.  She denies urinary symptoms.  Dr. Chapa has done bilateral modified radical mastectomies without reconstruction for breast cancer.  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  SCREENING TESTS  No mammograms  Year 2012 2015 2016 2017 2018   Age                         PAP                         HPV high risk                         Mammogram                         BHUPINDER score                         Breast MRI                         Lipids                         Vitamin D                         Colonoscopy                         DEXA  Frax (hip/any)                         Ovarian Screen                             She exercises regularly: yes.  She wears her seat belt: yes.  She has concerns about domestic violence: no.  She has noticed changes in height: no    GYN screening history:  · None.    No Additional Complaints Reported    The following portions of the patient's history were reviewed and updated as appropriate:vital signs and   She  has a past medical history of Chest pain (2016), History of echocardiogram, History of rheumatic fever (2016), Hyperlipidemia (2016), Hypertension (2016),  "Mitral regurgitation (9/7/2016), and Swelling of extremity, left (9/7/2016).  She does not have any pertinent problems on file.  She  has a past surgical history that includes Cardiac catheterization; Breast biopsy; Vaginal hysterectomy; and Tubal ligation.  Her family history includes Cancer in her father; Hypertension in her mother; Stroke in her father and mother.  She  reports that she has never smoked. She has never used smokeless tobacco. She reports that she does not drink alcohol or use drugs.  Current Outpatient Medications   Medication Sig Dispense Refill   • aspirin 81 MG EC tablet Take 81 mg by mouth Daily.     • Calcium-Phosphorus-Vitamin D (CITRACAL +D3 PO) Take 1,000 mg by mouth daily.     • Cranberry 500 MG capsule Take 500 mg by mouth daily.     • FLUAD 0.5 ML suspension prefilled syringe ADM 0.5ML IM UTD  0   • furosemide (LASIX) 20 MG tablet Take 1 tablet by mouth Daily. (Patient taking differently: Take 20 mg by mouth As Needed.) 180 tablet 0   • meclizine (ANTIVERT) 12.5 MG tablet Take 1 tablet by mouth As Needed.     • Multiple Vitamins-Minerals (CENTRUM ADULTS PO) Take 1 tablet by mouth Daily.     • omeprazole (PriLOSEC) 20 MG capsule Take 20 mg by mouth daily.     • PREVNAR 13 vaccine ADM 0.5ML IM UTD  0     No current facility-administered medications for this visit.      She is allergic to bactrim [sulfamethoxazole-trimethoprim]; ciprofloxacin; lortab [hydrocodone-acetaminophen]; and oxycontin [oxycodone hcl]..    Review of Systems  A comprehensive review of systems was taken.  Constitutional: negative for fever, chills, activity change, appetite change, fatigue and unexpected weight change.  Respiratory: negative  Cardiovascular: negative  Gastrointestinal: negative  Genitourinary:negative  Musculoskeletal:negative  Behavioral/Psych: negative       /86   Ht 161.3 cm (63.5\")   Wt 69.9 kg (154 lb 3.2 oz)   Breastfeeding No   BMI 26.89 kg/m²     Physical Exam    General:  alert; " cooperative; well developed; well nourished   Skin:  No suspicious lesions seen   Thyroid: normal to inspection and palpation   Lungs:  clear to auscultation bilaterally   Heart:  regular rate and rhythm  systolic murmur: early systolic 3/6, low pitch at 2nd left intercostal space   Breasts:  Examined in supine position  There are no palpable axillary nodes  Absent bilaterally   Abdomen: soft, non-tender; no masses  no umbilical or inguinal hernias are present  no hepato-splenomegaly   Pelvis: Clinical staff was present for exam  External genitalia:  normal appearance of the external genitalia including Bartholin's and [place]'s glands.  Vaginal:  atrophic mucosal changes are present;  Cervix:  absent.  Uterus:  absent.  Adnexa:  non palpable bilaterally.  Rectal:  anus visually normal appearing. recto-vaginal exam unremarkable and confirms findings;     Lab Review   No data reviewed    Imaging  No data reviewed         ASSESSMENT  Problems Addressed this Visit        Genitourinary    Menopause - Primary    Vaginal atrophy      Other Visit Diagnoses     Encounter for gynecological examination without abnormal finding                  Substance History:   reports that she has never smoked. She has never used smokeless tobacco.   reports that she does not drink alcohol.   reports that she does not use drugs.    Substance use counseling is not indicated based on patient history.      PLAN    · Medications prescribed this encounter:  No orders of the defined types were placed in this encounter.  · Compounded estradiol cream, 0.1 mg/gram and a dose of 0.5 g twice a week intravaginally  · Gastroenterology follow-up  · Calcium, 600 mg/ Vit. D, 400 IU daily; regular weight-bearing exercise  · Follow up: 12 month(s)  *Please note that portions of this documentation may have been completed with a voice recognition program.  Efforts were made to edit this dictation, but occasional words may have been mistranscribed.       This  note was electronically signed.    LEIGHTON Allen MD  January 16, 2020  3:16 PM

## 2020-02-18 ENCOUNTER — OFFICE VISIT (OUTPATIENT)
Dept: GASTROENTEROLOGY | Facility: CLINIC | Age: 80
End: 2020-02-18

## 2020-02-18 VITALS
BODY MASS INDEX: 26.85 KG/M2 | SYSTOLIC BLOOD PRESSURE: 161 MMHG | DIASTOLIC BLOOD PRESSURE: 89 MMHG | WEIGHT: 154 LBS | HEART RATE: 64 BPM

## 2020-02-18 DIAGNOSIS — R07.2 PRECORDIAL PAIN: Primary | ICD-10-CM

## 2020-02-18 DIAGNOSIS — R10.30 LOWER ABDOMINAL PAIN: ICD-10-CM

## 2020-02-18 PROCEDURE — 99214 OFFICE O/P EST MOD 30 MIN: CPT | Performed by: INTERNAL MEDICINE

## 2020-02-18 NOTE — PROGRESS NOTES
PCP:  Stephan Hubbard MD     No referring provider defined for this encounter.    Chief Complaint   Patient presents with   • Abdominal Pain        HPI   The patient is a 79-year-old known to me.  She is having issues.  She is having some atypical chest pain that does wake her at night.  If she hits her chest it feels like it improves to some degree.  It is happening much more frequently.  She was evaluated by cardiology and felt like this was not cardiac in origin.  She had a CAT scan which did not show any gallstones.  She thinks she had an ultrasound in the past year in [place].  She also has an issue where she gets suprapubic pain which is fairly fleeting that occurs after urination.  It also occurs after bowel movements but she usually urinating at that time.  She has had urethral dilations in the past.  She has seen urology.  CAT scan on 12/27/2019 showed no evidence of an acute interabdominal or pelvic abnormality.  Colonoscopy was incomplete.  There was some pus coming out of the diverticulum and there was some fixation due to adhesions.    Allergies   Allergen Reactions   • Bactrim [Sulfamethoxazole-Trimethoprim] Angioedema     Swelling of tongue   • Ciprofloxacin Hives   • Lortab [Hydrocodone-Acetaminophen] Hives   • Oxycontin [Oxycodone Hcl] Other (See Comments)     Patient states that she didn't feel well on this medication.          Current Outpatient Medications:   •  aspirin 81 MG EC tablet, Take 81 mg by mouth Daily., Disp: , Rfl:   •  Calcium-Phosphorus-Vitamin D (CITRACAL +D3 PO), Take 1,000 mg by mouth daily., Disp: , Rfl:   •  Cranberry 500 MG capsule, Take 500 mg by mouth daily., Disp: , Rfl:   •  FLUAD 0.5 ML suspension prefilled syringe, ADM 0.5ML IM UTD, Disp: , Rfl: 0  •  furosemide (LASIX) 20 MG tablet, Take 1 tablet by mouth Daily. (Patient taking differently: Take 20 mg by mouth As Needed.), Disp: 180 tablet, Rfl: 0  •  meclizine (ANTIVERT) 12.5 MG tablet, Take 1 tablet by mouth  As Needed., Disp: , Rfl:   •  Multiple Vitamins-Minerals (CENTRUM ADULTS PO), Take 1 tablet by mouth Daily., Disp: , Rfl:   •  omeprazole (PriLOSEC) 20 MG capsule, Take 20 mg by mouth daily., Disp: , Rfl:   •  PREVNAR 13 vaccine, ADM 0.5ML IM UTD, Disp: , Rfl: 0     Past Medical History:   Diagnosis Date   • Chest pain 9/7/2016   • History of echocardiogram     EKG done today shows normal sinus rhythm with no ST-T wave changes with a rate of 61 and a QTc of 404 msec.    • History of rheumatic fever 9/7/2016   • Hyperlipidemia 9/7/2016   • Hypertension 9/7/2016   • Mitral regurgitation 9/7/2016   • Swelling of extremity, left 9/7/2016       Past Surgical History:   Procedure Laterality Date   • BREAST BIOPSY     • CARDIAC CATHETERIZATION     • TUBAL ABDOMINAL LIGATION     • VAGINAL HYSTERECTOMY          Social History     Socioeconomic History   • Marital status:      Spouse name: Not on file   • Number of children: Not on file   • Years of education: Not on file   • Highest education level: Not on file   Tobacco Use   • Smoking status: Never Smoker   • Smokeless tobacco: Never Used   Substance and Sexual Activity   • Alcohol use: No   • Drug use: No   • Sexual activity: Defer        Family History   Problem Relation Age of Onset   • Hypertension Mother    • Stroke Mother         5 strokes   • Cancer Father    • Stroke Father         Review of Systems   Constitutional: Negative.    HENT: Negative for trouble swallowing and voice change.    Gastrointestinal: Positive for abdominal pain.        Vitals:    02/18/20 1550   BP: 161/89   Pulse: 64        Physical Exam   General Appearance: Alert, in no acute distress   Head: Normocephalic, without obvious abnormality, atraumatic   Eyes: Lids and lashes normal, conjunctivae and sclerae normal, no icterus, no pallor, corneas clear, PERRLA   Ears: Ears appear intact with no abnormalities noted   Extremities: Moves all extremities well, no edema, no cyanosis, no redness    Skin: No bleeding, bruising or rash   Neurologic: Cranial nerves 2 - 12 grossly intact, no focal deficits     Review of systems was reviewed and positives are noted. All of the remaining review of systems in that system are negative.    Jessie was seen today for abdominal pain.    Diagnoses and all orders for this visit:    Precordial pain    Lower abdominal pain    Impressions and plan #1 atypical chest pain: In my field the 2 things that can cause chest pain or esophageal pain from reflux or something such as eosinophilic esophagitis.  A paraesophageal hernia could cause similar symptoms.  The other potential possibility would be gallbladder disease.  Her gallbladder looked normal on CAT scan but that is a poor test to evaluate for stones.  If she has had a recent ultrasound that would be good.  We will try and obtain that from [facility].  If not she will probably end up with an ultrasound of the right upper quadrant.  I am going to schedule an upper endoscopy.  I like to look for any hernias, esophagitis, peptic ulcer disease, or eosinophilic esophagitis.  She will continue her omeprazole at the moment.    #2 history of colon polyps and lower abdominal pain: I have not had an occasional patient with diverticulitis get urinary symptoms.  It is not usually GI related.  Sometimes patients with Crohn's disease can get into trouble as well.  She will ultimately need another evaluation of the colon.  It may be best to consider a barium enema in case there is fixation.  Another option would be a repeat colonoscopy.  We will start with the upper endoscopy to start.    Rao Navarro MD

## 2020-02-19 ENCOUNTER — OUTSIDE FACILITY SERVICE (OUTPATIENT)
Dept: GASTROENTEROLOGY | Facility: CLINIC | Age: 80
End: 2020-02-19

## 2020-02-19 PROCEDURE — 43235 EGD DIAGNOSTIC BRUSH WASH: CPT | Performed by: INTERNAL MEDICINE

## 2020-02-24 DIAGNOSIS — R10.9 ABDOMINAL PAIN, UNSPECIFIED ABDOMINAL LOCATION: Primary | ICD-10-CM

## 2020-02-24 NOTE — PROGRESS NOTES
Dr. Navarro would like for her to have ACBE vs colonoscopy since her last one was incomplete.  Patient expressed understanding and was agreeable to that.  Order placed for ACBE and she will do clear liquids the day before and prep that evening.  She would like to use clenpiq which we are currently out of samples, she will just check back with us for samples.

## 2020-02-28 ENCOUNTER — HOSPITAL ENCOUNTER (OUTPATIENT)
Dept: GENERAL RADIOLOGY | Facility: HOSPITAL | Age: 80
Discharge: HOME | End: 2020-02-28

## 2020-02-28 DIAGNOSIS — R10.9 ABDOMINAL PAIN, UNSPECIFIED ABDOMINAL LOCATION: ICD-10-CM

## 2020-03-02 ENCOUNTER — HOSPITAL ENCOUNTER (OUTPATIENT)
Dept: GENERAL RADIOLOGY | Facility: HOSPITAL | Age: 80
Discharge: HOME | End: 2020-03-02
Admitting: INTERNAL MEDICINE

## 2020-03-02 PROCEDURE — A9270 NON-COVERED ITEM OR SERVICE: HCPCS | Performed by: INTERNAL MEDICINE

## 2020-03-02 PROCEDURE — 63710000001 BARIUM SULFATE 105 % SUSPENSION: Performed by: INTERNAL MEDICINE

## 2020-03-02 PROCEDURE — 74280 X-RAY XM COLON 2CNTRST STD: CPT

## 2020-03-02 RX ADMIN — BARIUM SULFATE 1000 ML: 1.05 SUSPENSION ORAL; RECTAL at 10:28

## 2020-10-28 ENCOUNTER — OFFICE VISIT (OUTPATIENT)
Dept: CARDIOLOGY | Facility: CLINIC | Age: 80
End: 2020-10-28

## 2020-10-28 VITALS
DIASTOLIC BLOOD PRESSURE: 80 MMHG | OXYGEN SATURATION: 98 % | HEIGHT: 63 IN | HEART RATE: 83 BPM | SYSTOLIC BLOOD PRESSURE: 138 MMHG | WEIGHT: 145 LBS | RESPIRATION RATE: 18 BRPM | BODY MASS INDEX: 25.69 KG/M2

## 2020-10-28 DIAGNOSIS — I34.0 NONRHEUMATIC MITRAL VALVE REGURGITATION: ICD-10-CM

## 2020-10-28 DIAGNOSIS — I35.1 NONRHEUMATIC AORTIC VALVE INSUFFICIENCY: ICD-10-CM

## 2020-10-28 DIAGNOSIS — R07.1 CHEST PAIN ON BREATHING: Primary | ICD-10-CM

## 2020-10-28 PROCEDURE — 93000 ELECTROCARDIOGRAM COMPLETE: CPT | Performed by: INTERNAL MEDICINE

## 2020-10-28 PROCEDURE — 99214 OFFICE O/P EST MOD 30 MIN: CPT | Performed by: INTERNAL MEDICINE

## 2020-10-28 NOTE — PROGRESS NOTES
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      PROBLEM LIST:   1. Hypertension.   2. Hyperlipidemia.   3. VHD  1. 5/16 echo bileaflet MVP mod mr, mild AI nl lvef  2. 9/20/17 echo: EF 60%,  mild MR, mild AI  4. CP  1. 10/23/2018 MPS: Lexiscan Cardiolite with moderate sized fixed apical defect with no reversibility EF>70%  5. Rheumatic fever as a child.  6. Osteoporosis  7. Hiatal Hernia  8. Hx breast ca  9. CP- 4/16 MPS wnl  10. Surgical Hx  1. Double mastectomy  2. Cataract extraction 2017      CC: fu MR/AI    Allergies  Allergies   Allergen Reactions   • Bactrim [Sulfamethoxazole-Trimethoprim] Angioedema     Swelling of tongue   • Ciprofloxacin Hives   • Lortab [Hydrocodone-Acetaminophen] Hives   • Oxycontin [Oxycodone Hcl] Other (See Comments)     Patient states that she didn't feel well on this medication.       Current Medications    Current Outpatient Medications:   •  aspirin 81 MG EC tablet, Take 81 mg by mouth Daily., Disp: , Rfl:   •  Calcium-Phosphorus-Vitamin D (CITRACAL +D3 PO), Take 1,000 mg by mouth daily., Disp: , Rfl:   •  Cranberry 500 MG capsule, Take 500 mg by mouth daily., Disp: , Rfl:   •  furosemide (LASIX) 20 MG tablet, Take 1 tablet by mouth Daily. (Patient taking differently: Take 20 mg by mouth As Needed.), Disp: 180 tablet, Rfl: 0  •  Multiple Vitamins-Minerals (CENTRUM ADULTS PO), Take 1 tablet by mouth Daily., Disp: , Rfl:   •  omeprazole (PriLOSEC) 20 MG capsule, Take 20 mg by mouth daily., Disp: , Rfl:   •  FLUAD 0.5 ML suspension prefilled syringe, ADM 0.5ML IM UTD, Disp: , Rfl: 0  •  meclizine (ANTIVERT) 12.5 MG tablet, Take 1 tablet by mouth As Needed., Disp: ,  "Rfl:   •  PREVNAR 13 vaccine, ADM 0.5ML IM UTD, Disp: , Rfl: 0      History of Present Illness   Jessie Perez is a 80 y.o. year old female here for follow up.  She is noted some increased lower extremity swelling mild shortness of breath.  She states that her  passed this summer and she is now able to get at least 5 hours of sleep.  She has had no recent laboratories that she can state.  She does note on occasion she will have epigastric discomfort in the mornings it tends to improve following eating    OBJECTIVE:  Vitals:    10/28/20 1301   BP: 138/80   Pulse: 83   Resp: 18   SpO2: 98%   Weight: 65.8 kg (145 lb)   Height: 160 cm (63\")     Constitutional:       Appearance: Healthy appearance. Not in distress.   Neck:      Vascular: No JVR. JVD normal.   Pulmonary:      Effort: Pulmonary effort is normal.      Breath sounds: Normal breath sounds. No wheezing. No rhonchi. No rales.   Chest:      Chest wall: Not tender to palpatation.   Cardiovascular:      PMI at left midclavicular line. Normal rate. Regular rhythm. Normal S1. Normal S2.      Murmurs: There is a systolic murmur.      No gallop. No click. No rub.   Pulses:     Intact distal pulses.   Edema:     Peripheral edema absent.   Abdominal:      General: Bowel sounds are normal.      Palpations: Abdomen is soft.      Tenderness: There is no abdominal tenderness.   Musculoskeletal: Normal range of motion.         General: No tenderness.   Skin:     General: Skin is warm and dry.   Neurological:      General: No focal deficit present.      Mental Status: Alert and oriented to person, place and time.         Diagnostic Data:    ECG 12 Lead    Date/Time: 10/28/2020 1:51 PM  Performed by: Rao Wei MD  Authorized by: Rao Wei MD   Previous ECG: no previous ECG available  Rhythm: sinus rhythm  BPM: 69    Clinical impression: non-specific ECG              ASSESSMENT:   Diagnosis Plan   1. Chest pain on breathing     2. Nonrheumatic aortic " valve insufficiency     3. Nonrheumatic mitral valve regurgitation         PLAN:  Atypical chest pain consideration of ischemic evaluation , patient wishes to defer at current given Covid pandemic    However heart disease for follow-up echocardiogram this following visit in 6    Unknown lipid status obtain most recent lipid profile from PCP        Stephan Hubbard MD, thank you for referring Ms. Perez for evaluation.  I have forwarded my electronically generated recommendations to you for review.  Please do not hesitate to call with any questions.      Rao Wei MD, FACC

## 2020-10-29 RX ORDER — FUROSEMIDE 20 MG/1
20 TABLET ORAL AS NEEDED
Qty: 90 TABLET | Refills: 1 | Status: SHIPPED | OUTPATIENT
Start: 2020-10-29 | End: 2022-04-28

## 2021-03-30 ENCOUNTER — TELEPHONE (OUTPATIENT)
Dept: CARDIOLOGY | Facility: CLINIC | Age: 81
End: 2021-03-30

## 2021-03-30 NOTE — TELEPHONE ENCOUNTER
Pt needing CC for upcoming hip surgery. LOV 10/28/20, pt currently only on ASA 81 mg. Please advise.

## 2021-03-31 NOTE — TELEPHONE ENCOUNTER
She was last seen by our service on 10/28/2020 at which time she was doing well but reported some increased lower extremity edema and shortness of breath.  She also had epigastric discomfort.  An echocardiogram was ordered which remains pending.  Her last stress test was 10/23/2018 and showed a moderate fixed apical defect without reversible ischemia.  I am not clear what kind of surgery she is scheduled to have but I think it would be safe for her to temporarily discontinue aspirin.  As far as her cardiac risks I think she should probably be seen or at least have a telemetry visit with Dr. Wei prior to clearance.

## 2021-03-31 NOTE — TELEPHONE ENCOUNTER
LVM for pt informing her of this. Provided office number and call back number for any further questions or concerns.

## 2021-04-05 ENCOUNTER — DOCUMENTATION (OUTPATIENT)
Dept: CARDIOLOGY | Facility: CLINIC | Age: 81
End: 2021-04-05

## 2021-04-05 NOTE — PROGRESS NOTES
Mrs. Perez will be admitted today to the [facility] per Dr. Araujo (orthopedic surgeon).  He will order an echo that will be performed tonight.  Dr. Aguero will review the echo in the morning and call me with results.  I will notify Yulia in Dr. Araujo's office of pre-op clearance.

## 2021-04-06 ENCOUNTER — DOCUMENTATION (OUTPATIENT)
Dept: CARDIOLOGY | Facility: CLINIC | Age: 81
End: 2021-04-06

## 2021-04-06 NOTE — PROGRESS NOTES
Mrs. Perez underwent a pre-operative echocardiogram on 4/5 which revealed an LVEF of 65 % with mild Aortic regurgitation.  She would be a low surgical risk to have her hip repaired.    Frederick Aguero MD/ Sheridan Jung RN

## 2021-04-10 ENCOUNTER — TELEPHONE (OUTPATIENT)
Dept: GASTROENTEROLOGY | Facility: OTHER | Age: 81
End: 2021-04-10

## 2021-04-10 NOTE — TELEPHONE ENCOUNTER
Patient's daughter notes that patient recently broke her hip was treated at [facility], and transferred to [facility] for rehab.  She has been experiencing significant abdominal pain, constipation, and is not eating.  She is apparently undergoing a CAT scan currently.  Patient's daughter had logistical questions about transfer to acute care if this becomes necessary.  I reassured her that the rehab physician can contact [facility] for transfer if needed.

## 2021-04-21 ENCOUNTER — HOSPITAL ENCOUNTER (OUTPATIENT)
Dept: CARDIOLOGY | Facility: HOSPITAL | Age: 81
End: 2021-04-21

## 2021-04-27 NOTE — PROGRESS NOTES
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      PROBLEM LIST:   1. Hypertension.   2. Hyperlipidemia.   3. VHD  1. 5/16 echo bileaflet MVP mod mr, mild AI nl lvef  2. 9/20/17 echo: EF 60%,  mild MR, mild AI  3. 2021 echo OSH  4. CP  1. 10/23/2018 MPS: Lexiscan Cardiolite with moderate sized fixed apical defect with no reversibility EF>70%  5. Rheumatic fever as a child.  6. Osteoporosis  7. Hiatal Hernia  8. Hx breast ca  9. CP- 4/16 MPS wnl  10. Surgical Hx  1. Double mastectomy  2. Cataract extraction 2017  3. 2021 hip ORIF([place]) , periop PTSD      CC: fu MR/AI    Allergies  Allergies   Allergen Reactions   • Bactrim [Sulfamethoxazole-Trimethoprim] Angioedema     Swelling of tongue   • Ciprofloxacin Hives   • Lortab [Hydrocodone-Acetaminophen] Hives   • Oxycontin [Oxycodone Hcl] Other (See Comments)     Patient states that she didn't feel well on this medication.       Current Medications    Current Outpatient Medications:   •  aspirin 81 MG EC tablet, Take 81 mg by mouth Daily., Disp: , Rfl:   •  Calcium-Phosphorus-Vitamin D (CITRACAL +D3 PO), Take 1,000 mg by mouth daily., Disp: , Rfl:   •  Cranberry 500 MG capsule, Take 500 mg by mouth daily., Disp: , Rfl:   •  furosemide (LASIX) 20 MG tablet, Take 1 tablet by mouth As Needed (PRN as needed for SBP over 120)., Disp: 90 tablet, Rfl: 1  •  Multiple Vitamins-Minerals (CENTRUM ADULTS PO), Take 1 tablet by mouth Daily., Disp: , Rfl:   •  omeprazole (PriLOSEC) 20 MG capsule, Take 20 mg by mouth daily., Disp: , Rfl:       History of Present Illness   Jessie Perez is a 80 y.o. year old female here for follow up.  Had a fall with hip  "fracture initially seen in outside hospital ER and was not felt to be fractured.  She continues with issues socially follow with PCP and review of hip fracture revealed femoral neck fracture required ORIF.  Unfortunately she had delayed treatment with subsequent pain medication became impacted with perioperative complications of constipation  Patient states that she had a hypotensive event while postoperative while in the hospital on data deficient.  Since discharge she is largely been less active she has low normal blood pressure.  She notes palpitations typically 2-3 times a day that are nonprovocative.      OBJECTIVE:  Vitals:    04/28/21 0908   BP: 98/68   BP Location: Right arm   Patient Position: Sitting   Pulse: 87   SpO2: 98%   Weight: 63 kg (139 lb)   Height: 160 cm (63\")     Constitutional:       Appearance: Healthy appearance. Not in distress.   Neck:      Vascular: No JVR. JVD normal.   Pulmonary:      Effort: Pulmonary effort is normal.      Breath sounds: Normal breath sounds. No wheezing. No rhonchi. No rales.   Chest:      Chest wall: Not tender to palpatation.   Cardiovascular:      PMI at left midclavicular line. Normal rate. Regular rhythm. Normal S1. Normal S2.      Murmurs: There is a systolic murmur.      No gallop. No click. No rub.   Pulses:     Intact distal pulses.   Edema:     Peripheral edema absent.   Abdominal:      General: Bowel sounds are normal.      Palpations: Abdomen is soft.      Tenderness: There is no abdominal tenderness.   Musculoskeletal: Normal range of motion.         General: No tenderness. Skin:     General: Skin is warm and dry.   Neurological:      General: No focal deficit present.      Mental Status: Alert and oriented to person, place and time.         Diagnostic Data:  Procedures      ASSESSMENT:   Diagnosis Plan   1. Palpitations     2. Nonrheumatic mitral valve regurgitation     3. Nonrheumatic aortic valve insufficiency         PLAN:  Atypical chest pain " -preserved LV function continued observation    Valvular heart disease-reviewed echocardiogram from [place]  Continued observation    Palpitations potentially nonsustained atrial tach will follow-up 48-hour Holter monitor.  Reticent to initiate beta-blockade at current as has low normal blood pressure perioperative anemia .        Stephan Hubbard MD, thank you for referring Ms. Perez for evaluation.  I have forwarded my electronically generated recommendations to you for review.  Please do not hesitate to call with any questions.      Rao Wei MD, FACC

## 2021-04-28 ENCOUNTER — OFFICE VISIT (OUTPATIENT)
Dept: CARDIOLOGY | Facility: CLINIC | Age: 81
End: 2021-04-28

## 2021-04-28 VITALS
OXYGEN SATURATION: 98 % | DIASTOLIC BLOOD PRESSURE: 68 MMHG | HEART RATE: 87 BPM | BODY MASS INDEX: 24.63 KG/M2 | HEIGHT: 63 IN | SYSTOLIC BLOOD PRESSURE: 98 MMHG | WEIGHT: 139 LBS

## 2021-04-28 DIAGNOSIS — I34.0 NONRHEUMATIC MITRAL VALVE REGURGITATION: ICD-10-CM

## 2021-04-28 DIAGNOSIS — R00.2 PALPITATIONS: Primary | ICD-10-CM

## 2021-04-28 DIAGNOSIS — I35.1 NONRHEUMATIC AORTIC VALVE INSUFFICIENCY: ICD-10-CM

## 2021-04-28 PROCEDURE — 99214 OFFICE O/P EST MOD 30 MIN: CPT | Performed by: INTERNAL MEDICINE

## 2021-05-04 ENCOUNTER — HOSPITAL ENCOUNTER (OUTPATIENT)
Dept: CARDIOLOGY | Facility: HOSPITAL | Age: 81
Discharge: HOME | End: 2021-05-04

## 2021-05-04 DIAGNOSIS — Z00.6 ENCOUNTER FOR EXAMINATION FOR NORMAL COMPARISON OR CONTROL IN CLINICAL RESEARCH PROGRAM: ICD-10-CM

## 2021-05-18 ENCOUNTER — TELEPHONE (OUTPATIENT)
Dept: CARDIOLOGY | Facility: CLINIC | Age: 81
End: 2021-05-18

## 2021-05-18 NOTE — TELEPHONE ENCOUNTER
Spoke with patient that most recent holter exam was within normal limits. Pt verbalized understanding

## 2021-05-28 ENCOUNTER — TELEPHONE (OUTPATIENT)
Dept: OBSTETRICS AND GYNECOLOGY | Facility: CLINIC | Age: 81
End: 2021-05-28

## 2021-05-28 NOTE — TELEPHONE ENCOUNTER
Patient needs a refill on her Estrace vaginal cream sent to her pharmacy.  She has an appointment scheduled for 6/22/21 at 3:45 p.m.

## 2021-05-28 NOTE — TELEPHONE ENCOUNTER
Spoke with patient and advised her that Dr. Allen sent in a prescription for her estradiol cream to her pharmacy.  Patient verbalized understanding and had no questions at this time.

## 2021-06-11 ENCOUNTER — TELEPHONE (OUTPATIENT)
Dept: CARDIOLOGY | Facility: CLINIC | Age: 81
End: 2021-06-11

## 2021-06-11 NOTE — TELEPHONE ENCOUNTER
PT called requesting cardiac clearance for right JOSE with . Pt last seen on 04/28/21 and is only on ASA. Please advise

## 2021-06-22 ENCOUNTER — OFFICE VISIT (OUTPATIENT)
Dept: OBSTETRICS AND GYNECOLOGY | Facility: CLINIC | Age: 81
End: 2021-06-22

## 2021-06-22 VITALS
BODY MASS INDEX: 24.91 KG/M2 | WEIGHT: 140.6 LBS | DIASTOLIC BLOOD PRESSURE: 72 MMHG | HEIGHT: 63 IN | SYSTOLIC BLOOD PRESSURE: 118 MMHG

## 2021-06-22 DIAGNOSIS — N95.2 VAGINAL ATROPHY: ICD-10-CM

## 2021-06-22 DIAGNOSIS — M85.89 OSTEOPENIA OF MULTIPLE SITES: ICD-10-CM

## 2021-06-22 DIAGNOSIS — Z78.0 MENOPAUSE: Primary | ICD-10-CM

## 2021-06-22 PROCEDURE — 99213 OFFICE O/P EST LOW 20 MIN: CPT | Performed by: OBSTETRICS & GYNECOLOGY

## 2021-06-22 NOTE — PROGRESS NOTES
Chief Complaint   Patient presents with   • Gynecologic Exam       Jessie Perez is a 80 y.o. 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  This patient has previously had tubal sterilization and a vaginal hysterectomy.  She had a CT scan of the abdomen and pelvis in 2019 revealing no pelvic masses.  She has had bilateral mastectomies for carcinoma of the left breast (Dr. Chapa).  She is currently using compounded estradiol cream, 0.1 mg/gram in a dose of 0.5 g intravaginally twice a week.  She has no side effects on this medication.      Prob List  Visit Dx  Meds     Problem list reviewed    Medications reviewed  No mSCREENING TESTS   No mammograms  Year 2012   Age                         PAP                         HPV high risk                         Mammogram                         BHUPINDER score                         Breast MRI                         Lipids                         Vitamin D                         Colonoscopy                         DEXA  Frax (hip/any)                         Ovarian Screen        normal                     GYN screening history:  · No mammograms due to mastectomies    No Additional Complaints Reported    The following portions of the patient's history were reviewed and updated as appropriate:vital signs and   She  has a past medical history of Chest pain (2016), Hip fracture (CMS/formerly [facility]), History of echocardiogram, History of rheumatic fever (2016), Hyperlipidemia (2016), Hypertension (2016), Mitral regurgitation (2016), and Swelling of extremity, left (2016).  She does not have any pertinent problems on file.  She  has a past surgical history that includes Cardiac catheterization; Breast biopsy; Vaginal hysterectomy; Tubal ligation; and Hip fracture surgery.  Her  "family history includes Cancer in her father; Hypertension in her mother; Stroke in her father and mother.  She  reports that she has never smoked. She has never used smokeless tobacco. She reports that she does not drink alcohol and does not use drugs.  Current Outpatient Medications   Medication Sig Dispense Refill   • aspirin 81 MG EC tablet Take 81 mg by mouth Daily.     • Calcium-Phosphorus-Vitamin D (CITRACAL +D3 PO) Take 1,000 mg by mouth daily.     • Cranberry 500 MG capsule Take 500 mg by mouth daily.     • furosemide (LASIX) 20 MG tablet Take 1 tablet by mouth As Needed (PRN as needed for SBP over 120). 90 tablet 1   • Multiple Vitamins-Minerals (CENTRUM ADULTS PO) Take 1 tablet by mouth Daily.     • omeprazole (PriLOSEC) 20 MG capsule Take 20 mg by mouth daily.       No current facility-administered medications for this visit.     She is allergic to bactrim [sulfamethoxazole-trimethoprim], ciprofloxacin, lortab [hydrocodone-acetaminophen], and oxycontin [oxycodone hcl]..    Review of Systems  A review of systems was taken.  Constitutional: negative for fever, chills, activity change, appetite change, fatigue and unexpected weight change.  Respiratory: negative  Cardiovascular: negative  Gastrointestinal: negative  Genitourinary:negative  Musculoskeletal:positive for right hip pain  Behavioral/Psych: negative     I have reviewed and confirmed the accuracy of the ROS as documented by the MA/LPN/RN LEIGHTON Allen MD    /72   Ht 160 cm (63\")   Wt 63.8 kg (140 lb 9.6 oz)   Breastfeeding No   BMI 24.91 kg/m²     BMI reviewed     MEDICALLY INDICATED   Physical Exam    Neuro: alert and oriented to person, place and time   General:  alert; cooperative; well developed; well nourished   Skin:  No suspicious lesions seen   Thyroid: normal to inspection and palpation   Lungs:  breathing is unlabored  clear to auscultation bilaterally   Heart:  regular rate and rhythm, S1, S2 normal, no murmur, click, rub or " gallop  normal apical impulse   Breasts:  Examined in supine position  Absent with scars   Abdomen: soft, non-tender; no masses  no umbilical or inguinal hernias are present  no hepato-splenomegaly   Pelvis: Clinical staff was present for exam  External genitalia:  normal appearance of the external genitalia including Bartholin's and [place]'s glands.  Vaginal:  normal pink mucosa without prolapse or lesions.  Cervix:  absent.  Uterus:  absent.  Adnexa:  non palpable bilaterally.  Rectal:  anus visually normal appearing. recto-vaginal exam unremarkable and confirms findings;     Results Review:    Labs  Imaging  Meds  Media :23}      Labs reviewed    Imaging reviewed- CT scan    Medications reviewed            ASSESSMENT  Problems Addressed this Visit        Genitourinary and Reproductive     Menopause - Primary    Vaginal atrophy      Diagnoses       Codes Comments    Menopause    -  Primary ICD-10-CM: Z78.0  ICD-9-CM: 627.2     Vaginal atrophy     ICD-10-CM: N95.2  ICD-9-CM: 627.3       Vaginal atrophy secondary to menopause is a chronic problem which is effectively treated with compounded estradiol cream, 0.1 mg/gram in a dose of 0.5 g intravaginally twice a week.  I have recommended that the patient have a DEXA done.  I recommended that she continue exercising and taking calcium with vitamin D.  She is not a candidate for mammograms.        Substance History:   reports that she has never smoked. She has never used smokeless tobacco.   reports no history of alcohol use.   reports no history of drug use.    Substance use counseling is not indicated based on patient history.      PLAN    · Medications prescribed this encounter:  No orders of the defined types were placed in this encounter.  · DEXA ordered  · Compounded estradiol cream, 0.1 mg/gram in a dose of 0.5 g intravaginally twice a week  · Calcium, 600 mg/ Vit. D, 400 IU daily; regular weight-bearing exercise  · One year F/U  *Please note that portions of  this documentation may have been completed with a voice recognition program.  Efforts were made to edit this dictation, but occasional words may have been mistranscribed.       This note was electronically signed.    LEIGHTON Allen MD  June 22, 2021  16:25 EDT

## 2021-06-24 ENCOUNTER — OFFICE VISIT (OUTPATIENT)
Dept: CARDIOLOGY | Facility: CLINIC | Age: 81
End: 2021-06-24

## 2021-06-24 VITALS
HEART RATE: 77 BPM | SYSTOLIC BLOOD PRESSURE: 118 MMHG | OXYGEN SATURATION: 97 % | WEIGHT: 141 LBS | BODY MASS INDEX: 24.07 KG/M2 | DIASTOLIC BLOOD PRESSURE: 66 MMHG | HEIGHT: 64 IN

## 2021-06-24 DIAGNOSIS — I34.0 NONRHEUMATIC MITRAL VALVE REGURGITATION: Primary | ICD-10-CM

## 2021-06-24 DIAGNOSIS — I35.1 NONRHEUMATIC AORTIC VALVE INSUFFICIENCY: ICD-10-CM

## 2021-06-24 DIAGNOSIS — R00.2 PALPITATIONS: ICD-10-CM

## 2021-06-24 PROCEDURE — 99214 OFFICE O/P EST MOD 30 MIN: CPT | Performed by: INTERNAL MEDICINE

## 2021-06-24 NOTE — PROGRESS NOTES
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      PROBLEM LIST:   1. Hypertension.   2. Hyperlipidemia  8/19 194/57/182/107  3. VHD  1. 5/16 echo bileaflet MVP mod mr, mild AI nl lvef  2. 9/20/17 echo: EF 60%,  mild MR, mild AI  3. 2021 echo ([place]) EF 65% mild + MR, mild AI  4. CP  1. 10/23/2018 MPS: Lexiscan Cardiolite with moderate sized fixed apical defect with no reversibility EF>70%  5. Rheumatic fever as a child.  6. Osteoporosis  7. Hiatal Hernia  8. Hx breast ca  9. CP- 4/16 MPS wnl  10. Surgical Hx  1. Double mastectomy  2. Cataract extraction 2017  3. 2021 hip ORIF([place]) , preop constipation , periop hypotension and PTSD      CC: fu MR/AI    Allergies  Allergies   Allergen Reactions   • Bactrim [Sulfamethoxazole-Trimethoprim] Angioedema     Swelling of tongue   • Ciprofloxacin Hives   • Lortab [Hydrocodone-Acetaminophen] Hives   • Oxycontin [Oxycodone Hcl] Other (See Comments)     Patient states that she didn't feel well on this medication.       Current Medications    Current Outpatient Medications:   •  aspirin 81 MG EC tablet, Take 81 mg by mouth Daily., Disp: , Rfl:   •  Calcium-Phosphorus-Vitamin D (CITRACAL +D3 PO), Take 1,000 mg by mouth daily., Disp: , Rfl:   •  Cranberry 500 MG capsule, Take 500 mg by mouth daily., Disp: , Rfl:   •  furosemide (LASIX) 20 MG tablet, Take 1 tablet by mouth As Needed (PRN as needed for SBP over 120)., Disp: 90 tablet, Rfl: 1  •  Multiple Vitamins-Minerals (CENTRUM ADULTS PO), Take 1 tablet by mouth Daily., Disp: , Rfl:   •  omeprazole (PriLOSEC) 20 MG capsule, Take 20 mg by mouth daily., Disp: , Rfl:       History of Present Illness  "  Jessie Perez is a 80 y.o. year old female here for follow up.    No cardiac issues since last visit.  She is scheduled to have elective left hip replacement in [facility] upcoming.      OBJECTIVE:  Vitals:    06/24/21 0946   BP: 118/66   BP Location: Left arm   Patient Position: Sitting   Pulse: 77   SpO2: 97%   Weight: 64 kg (141 lb)   Height: 161.3 cm (63.5\")     Constitutional:       Appearance: Healthy appearance. Not in distress.   Neck:      Vascular: No JVR. JVD normal.   Pulmonary:      Effort: Pulmonary effort is normal.      Breath sounds: Normal breath sounds. No wheezing. No rhonchi. No rales.   Chest:      Chest wall: Not tender to palpatation.   Cardiovascular:      PMI at left midclavicular line. Normal rate. Regular rhythm. Normal S1. Normal S2.      Murmurs: There is a systolic murmur.      No gallop. No click. No rub.   Pulses:     Intact distal pulses.   Edema:     Peripheral edema absent.   Abdominal:      General: Bowel sounds are normal.      Palpations: Abdomen is soft.      Tenderness: There is no abdominal tenderness.   Musculoskeletal: Normal range of motion.         General: No tenderness. Skin:     General: Skin is warm and dry.   Neurological:      General: No focal deficit present.      Mental Status: Alert and oriented to person, place and time.         Diagnostic Data:  Procedures      ASSESSMENT:   Diagnosis Plan   1. Nonrheumatic mitral valve regurgitation     2. Nonrheumatic aortic valve insufficiency     3. Palpitations         PLAN:  Atypical chest pain -preserved LV function continued observation    Valvular heart disease-reviewed echocardiogram from [place]  Continued observation preserved LV function and normal cardiac indices    Patient will be acceptable cardiac risk for proposed hip surgery            Stephan Hubbard MD, thank you for referring Ms. Perez for evaluation.  I have forwarded my electronically generated recommendations to you for " review.  Please do not hesitate to call with any questions.      Rao Wei MD, FACC

## 2021-07-28 ENCOUNTER — TELEPHONE (OUTPATIENT)
Dept: CARDIOLOGY | Facility: CLINIC | Age: 81
End: 2021-07-28

## 2021-07-28 NOTE — TELEPHONE ENCOUNTER
"Pt called and she is 2 weeks post op hip replacement and keeps having \" vagaling/passing out episodes\" and was advised to reach out to her heart doctor to see if any testing needs to be done. Pt states \" its not my sugar macrina that's been fine every time they check it and my blood pressure has been 112-117 over something\".RN encouraged fall risk protection and maintain adequate hydration. Please advise   "

## 2021-07-29 NOTE — TELEPHONE ENCOUNTER
Called patient with  recommendations. Pt states she has had post blood work and has not been taking lasix for quite sometime. RN advised to stay hydrated and records BP and HR over the next days and call us back with readings. RN also advised to talk to PCP and let him know of issues and pt states she has appt with him tomorrow morning and will make him aware

## 2021-11-16 ENCOUNTER — TELEPHONE (OUTPATIENT)
Dept: OBSTETRICS AND GYNECOLOGY | Facility: CLINIC | Age: 81
End: 2021-11-16

## 2021-11-16 NOTE — TELEPHONE ENCOUNTER
Pharmacy called and patient's prescription for compounded estradiol vaginal cream 0.1 mg/gm was OK'd through 6/22.

## 2022-04-27 NOTE — PROGRESS NOTES
[facility] Cardiology  Office Progress Note  Jessie Perez  1940  [address] [facility] [address]       Visit Date: 04/28/22    PCP: Stephan Hubbard MD  [address]   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    PROBLEM LIST:   1. Hypertension.   2. Hyperlipidemia  1. 8/19 194/57/182/107  3. VHD  1. 5/16 echo bileaflet MVP mod mr, mild AI nl lvef  2. 9/20/17 echo: EF 60%,  mild MR, mild AI  3. 2021 echo ([place]) EF 65% mild + MR, mild AI  4. CP  1. 10/23/2018 MPS: Lexiscan Cardiolite with moderate sized fixed apical defect with no reversibility EF>70%  5. Rheumatic fever as a child.  6. Osteoporosis  7. Hiatal Hernia  8. Hx breast ca  9. CP- 4/16 MPS wnl  10. Surgical Hx  1. Double mastectomy  2. Cataract extraction 2017  3. 2021 hip ORIF([facility]) , preop constipation , periop hypotension and PTSD      CC:   Chief Complaint   Patient presents with   • Nonrheumatic mitral valve regurgitation       Allergies  Allergies   Allergen Reactions   • Bactrim [Sulfamethoxazole-Trimethoprim] Angioedema     Swelling of tongue   • Ciprofloxacin Hives   • Lortab [Hydrocodone-Acetaminophen] Hives   • Oxycontin [Oxycodone Hcl] Other (See Comments)     Patient states that she didn't feel well on this medication.       Current Medications    Current Outpatient Medications:   •  aspirin 81 MG EC tablet, Take 81 mg by mouth Daily., Disp: , Rfl:   •  Calcium-Phosphorus-Vitamin D (CITRACAL +D3 PO), Take 1,000 mg by mouth daily., Disp: , Rfl:   •  Cranberry 500 MG capsule, Take 500 mg by mouth daily., Disp: , Rfl:   •  Multiple Vitamins-Minerals (CENTRUM ADULTS PO), Take 1 tablet by mouth Daily., Disp: , Rfl:   •  omeprazole (PriLOSEC) 20 MG capsule, Take 20 mg by mouth daily., Disp: , Rfl:       History of Present Illness   Jessie Perez is a 81 y.o. year old female  "here for follow up.    Pt denies any chest pain, dyspnea, dyspnea on exertion, orthopnea, PND, palpitations, lower extremity edema, or claudication.      OBJECTIVE:  Vitals:    04/28/22 1444   BP: 122/70   BP Location: Right arm   Patient Position: Sitting   Pulse: 82   SpO2: 98%   Weight: 65.8 kg (145 lb)   Height: 161.3 cm (63.5\")     Body mass index is 25.28 kg/m².    Constitutional:       Appearance: Healthy appearance. Not in distress.   Neck:      Vascular: No JVR. JVD normal.   Pulmonary:      Effort: Pulmonary effort is normal.      Breath sounds: Normal breath sounds. No wheezing. No rhonchi. No rales.   Chest:      Chest wall: Not tender to palpatation.   Cardiovascular:      PMI at left midclavicular line. Normal rate. Regular rhythm. Normal S1. Normal S2.      Murmurs: There is no murmur.      No gallop. No click. No rub.   Pulses:     Intact distal pulses.   Edema:     Peripheral edema absent.   Abdominal:      General: Bowel sounds are normal.      Palpations: Abdomen is soft.      Tenderness: There is no abdominal tenderness.   Musculoskeletal: Normal range of motion.         General: No tenderness. Skin:     General: Skin is warm and dry.   Neurological:      General: No focal deficit present.      Mental Status: Alert and oriented to person, place and time.         Diagnostic Data:  Procedures      ASSESSMENT:   Diagnosis Plan   1. Palpitations  Holter Monitor - 72 Hour Up To 15 Days   2. 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  Potentially pill in a pocket Zebeta be utilized for as needed breakthroughs          Rao Wei MD, FACC  "

## 2022-04-28 ENCOUNTER — OFFICE VISIT (OUTPATIENT)
Dept: CARDIOLOGY | Facility: CLINIC | Age: 82
End: 2022-04-28

## 2022-04-28 VITALS
DIASTOLIC BLOOD PRESSURE: 70 MMHG | OXYGEN SATURATION: 98 % | WEIGHT: 145 LBS | BODY MASS INDEX: 24.75 KG/M2 | SYSTOLIC BLOOD PRESSURE: 122 MMHG | HEIGHT: 64 IN | HEART RATE: 82 BPM

## 2022-04-28 DIAGNOSIS — I34.0 NONRHEUMATIC MITRAL VALVE REGURGITATION: ICD-10-CM

## 2022-04-28 DIAGNOSIS — R00.2 PALPITATIONS: Primary | ICD-10-CM

## 2022-04-28 PROCEDURE — 99213 OFFICE O/P EST LOW 20 MIN: CPT | Performed by: INTERNAL MEDICINE

## 2022-05-13 ENCOUNTER — TELEPHONE (OUTPATIENT)
Dept: CARDIOLOGY | Facility: CLINIC | Age: 82
End: 2022-05-13

## 2022-09-14 ENCOUNTER — TELEPHONE (OUTPATIENT)
Dept: OBSTETRICS AND GYNECOLOGY | Facility: CLINIC | Age: 82
End: 2022-09-14

## 2022-09-14 NOTE — TELEPHONE ENCOUNTER
Incoming Refill Request      Medication requested (name and dose): compounded estradiol vaginal cream 0.1 mg/gm    [facility]    Additional details provided by patient: none    Best call back number: compounded estradiol vaginal cream 0.1 mg/gm    Does the patient have less than a 3 day supply:  [] Yes  [x] No    Nicholas Carlton Rep  09/14/22, 10:55 EDT

## 2022-09-14 NOTE — TELEPHONE ENCOUNTER
I have called the patient to let her know that she is overdue for annual exam, and must schedule that appointment before we can refill medication.  Her call was transferred to the  for scheduling.  I called [facility] to OK prescription refill request.  I spoke with Branden Castellano, pharmacist, and he states that he spoke with Patricia earlier when she called regarding another patient, and that she OK'd refills for Jessie Perez at that time.

## 2022-10-13 ENCOUNTER — OFFICE VISIT (OUTPATIENT)
Dept: CARDIOLOGY | Facility: CLINIC | Age: 82
End: 2022-10-13

## 2022-10-13 VITALS
WEIGHT: 148 LBS | SYSTOLIC BLOOD PRESSURE: 140 MMHG | BODY MASS INDEX: 25.27 KG/M2 | HEART RATE: 74 BPM | DIASTOLIC BLOOD PRESSURE: 64 MMHG | OXYGEN SATURATION: 98 % | HEIGHT: 64 IN

## 2022-10-13 DIAGNOSIS — R07.2 PRECORDIAL PAIN: Primary | ICD-10-CM

## 2022-10-13 PROCEDURE — 99213 OFFICE O/P EST LOW 20 MIN: CPT | Performed by: INTERNAL MEDICINE

## 2022-10-13 PROCEDURE — 93000 ELECTROCARDIOGRAM COMPLETE: CPT | Performed by: INTERNAL MEDICINE

## 2022-10-13 RX ORDER — FUROSEMIDE 20 MG/1
20 TABLET ORAL AS NEEDED
COMMUNITY

## 2022-10-13 RX ORDER — ZINC GLUCONATE 50 MG
50 TABLET ORAL DAILY
COMMUNITY

## 2022-10-13 NOTE — PROGRESS NOTES
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    PROBLEM LIST:   1. Hypertension.   2. Hyperlipidemia  1. 8/19 194/57/182/107  3. VHD  1. 5/16 echo bileaflet MVP mod mr, mild AI nl lvef  2. 9/20/17 echo: EF 60%,  mild MR, mild AI  3. 2021 echo ([place]) EF 65% mild + MR, mild AI  4. CP  1. 10/23/2018 MPS: Lexiscan Cardiolite with moderate sized fixed apical defect with no reversibility EF>70%  2. 12/19 ct abd no Ao or cor calcium  5. Rheumatic fever as a child.  6. Osteoporosis  7. Hiatal Hernia  8. Hx breast ca  9. CP- 4/16 MPS wnl  10. Surgical Hx  1. Double mastectomy  2. Cataract extraction 2017  3. 2021 hip ORIF([facility]) , preop constipation , periop hypotension and PTSD      CC:   Chief Complaint   Patient presents with   • Palpitations     Follow up   • Dizziness       Allergies  Allergies   Allergen Reactions   • Bactrim [Sulfamethoxazole-Trimethoprim] Angioedema     Swelling of tongue   • Ciprofloxacin Hives   • Lortab [Hydrocodone-Acetaminophen] Hives   • Oxycontin [Oxycodone Hcl] Other (See Comments)     Patient states that she didn't feel well on this medication.       Current Medications    Current Outpatient Medications:   •  aspirin 81 MG EC tablet, Take 81 mg by mouth Daily., Disp: , Rfl:   •  Calcium-Phosphorus-Vitamin D (CITRACAL +D3 PO), Take 1,000 mg by mouth daily., Disp: , Rfl:   •  Cranberry 500 MG capsule, Take 500 mg by mouth daily., Disp: , Rfl:   •  furosemide (LASIX) 20 MG tablet, Take 1 tablet by mouth As Needed., Disp: , Rfl:   •  Multiple Vitamins-Minerals (CENTRUM ADULTS PO), Take 1 tablet by mouth Daily., Disp: , Rfl:   •  omeprazole (PriLOSEC) 20 MG capsule, Take  "20 mg by mouth daily., Disp: , Rfl:   •  POTASSIUM PO, Take 90 mg by mouth Daily., Disp: , Rfl:   •  zinc gluconate 50 MG tablet, Take 1 tablet by mouth Daily., Disp: , Rfl:       History of Present Illness   Jessie Perez is a 82 y.o. year old female here for follow up.    She notes occasionally in a.m. she will awaken with nauseous feeling.  She is taking for the reason the Lasix and attempt to improve her nausea.  She has had no bright red blood per rectum and is compliant of her omeprazole.      OBJECTIVE:  Vitals:    10/13/22 1531   BP: 140/64   BP Location: Left arm   Patient Position: Sitting   Pulse: 74   SpO2: 98%   Weight: 67.1 kg (148 lb)   Height: 161.3 cm (63.5\")     Body mass index is 25.81 kg/m².    Constitutional:       Appearance: Healthy appearance. Not in distress.   Neck:      Vascular: No JVR. JVD normal.   Pulmonary:      Effort: Pulmonary effort is normal.      Breath sounds: Normal breath sounds. No wheezing. No rhonchi. No rales.   Chest:      Chest wall: Not tender to palpatation.   Cardiovascular:      PMI at left midclavicular line. Normal rate. Regular rhythm. Normal S1. Normal S2.      Murmurs: There is no murmur.      No gallop. No click. No rub.   Pulses:     Intact distal pulses.   Edema:     Peripheral edema absent.   Abdominal:      General: Bowel sounds are normal.      Palpations: Abdomen is soft.      Tenderness: There is no abdominal tenderness.   Musculoskeletal: Normal range of motion.         General: No tenderness. Skin:     General: Skin is warm and dry.   Neurological:      General: No focal deficit present.      Mental Status: Alert and oriented to person, place and time.         Diagnostic Data:    ECG 12 Lead    Date/Time: 10/13/2022 3:59 PM  Performed by: Rao Wei MD  Authorized by: Rao Wei MD   Previous ECG: no previous ECG available  Rhythm: sinus rhythm  BPM: 58    Clinical impression: non-specific ECG              ASSESSMENT:   Diagnosis " Plan   1. Precordial pain            PLAN:    Precordial pain more likely to be related to her hiatal hernia and GERD recommended trial of Maalox or Mylanta.    Instructed her to only take furosemide on days that her weight is up 2 pounds in 24 hours        Rao Wei MD, FACC

## 2022-10-26 ENCOUNTER — OFFICE VISIT (OUTPATIENT)
Dept: OBSTETRICS AND GYNECOLOGY | Facility: CLINIC | Age: 82
End: 2022-10-26

## 2022-10-26 VITALS
SYSTOLIC BLOOD PRESSURE: 150 MMHG | BODY MASS INDEX: 25.4 KG/M2 | DIASTOLIC BLOOD PRESSURE: 82 MMHG | HEIGHT: 64 IN | WEIGHT: 148.8 LBS

## 2022-10-26 DIAGNOSIS — I10 HYPERTENSION, UNSPECIFIED TYPE: ICD-10-CM

## 2022-10-26 DIAGNOSIS — Z01.411 ENCOUNTER FOR GYNECOLOGICAL EXAMINATION WITH ABNORMAL FINDING: Primary | ICD-10-CM

## 2022-10-26 DIAGNOSIS — Z85.3 HISTORY OF BREAST CANCER: ICD-10-CM

## 2022-10-26 DIAGNOSIS — N95.2 ATROPHY OF VAGINA: ICD-10-CM

## 2022-10-26 PROCEDURE — G0101 CA SCREEN;PELVIC/BREAST EXAM: HCPCS | Performed by: NURSE PRACTITIONER

## 2022-10-26 NOTE — PROGRESS NOTES
Chief Complaint  Jessie Perez is a 82 y.o.  female presenting for Gynecologic Exam    History of Present Illness  Mrs. Angeles is a very pleasant, alert & articulate 81yo woman.  She is using compounded estradiol cream vaginally for the atrophy.  We reviewed her medical and surgical hx together.  She is s/p bilat mastectomies for left breast cancer.  She is also s/p BTL and a vaginal hysterectomy.  We also discussed her left hip fx with accidental fall, and the right hip replacement.  She has managed to avoid COVID infection & states she is fully vaccinated.  (Her daughter in [place] almost  of it Oct 2021-- 3 mo hospitalization.)  Otherwise, ROS negative.  GI issues are much better (hx C diff) now.      The following portions of the patient's history were reviewed and updated as appropriate: allergies, current medications, past family history, past medical history, past social history, past surgical history and problem list.    Allergies   Allergen Reactions   • Bactrim [Sulfamethoxazole-Trimethoprim] Angioedema     Swelling of tongue   • Ciprofloxacin Hives   • Lortab [Hydrocodone-Acetaminophen] Hives   • Oxycontin [Oxycodone Hcl] Other (See Comments)     Patient states that she didn't feel well on this medication.         Current Outpatient Medications:   •  aspirin 81 MG EC tablet, Take 81 mg by mouth Daily., Disp: , Rfl:   •  Calcium-Phosphorus-Vitamin D (CITRACAL +D3 PO), Take 1,000 mg by mouth daily., Disp: , Rfl:   •  Cranberry 500 MG capsule, Take 500 mg by mouth daily., Disp: , Rfl:   •  furosemide (LASIX) 20 MG tablet, Take 1 tablet by mouth As Needed., Disp: , Rfl:   •  Multiple Vitamins-Minerals (CENTRUM ADULTS PO), Take 1 tablet by mouth Daily., Disp: , Rfl:   •  omeprazole (PriLOSEC) 20 MG capsule, Take 20 mg by mouth daily., Disp: , Rfl:   •  POTASSIUM PO, Take 90 mg by mouth Daily., Disp: , Rfl:   •  zinc gluconate 50 MG tablet, Take 1 tablet by mouth Daily., Disp: , Rfl:     Past Medical  "History:   Diagnosis Date   • C. difficile diarrhea    • Chest pain 09/07/2016   • Hip fracture (HCC)    • History of echocardiogram     EKG done today shows normal sinus rhythm with no ST-T wave changes with a rate of 61 and a QTc of 404 msec.    • History of rheumatic fever 09/07/2016   • Hyperlipidemia 09/07/2016   • Hypertension 09/07/2016   • Menopause    • Mitral regurgitation 09/07/2016   • Swelling of extremity, left 09/07/2016        Past Surgical History:   Procedure Laterality Date   • BREAST BIOPSY     • CARDIAC CATHETERIZATION     • HIP FRACTURE SURGERY Left    • TOTAL HIP ARTHROPLASTY Right    • TUBAL ABDOMINAL LIGATION     • VAGINAL HYSTERECTOMY         Objective  /82   Ht 161.3 cm (63.5\")   Wt 67.5 kg (148 lb 12.8 oz)   Breastfeeding No   BMI 25.95 kg/m²     Physical Exam  Vitals and nursing note reviewed. Exam conducted with a chaperone present.   Constitutional:       Appearance: Normal appearance.   HENT:      Head: Normocephalic.   Neck:      Thyroid: No thyroid mass or thyromegaly.   Cardiovascular:      Rate and Rhythm: Normal rate and regular rhythm.      Heart sounds: Normal heart sounds.   Pulmonary:      Effort: Pulmonary effort is normal.      Breath sounds: Normal breath sounds.   Chest:   Breasts:     Right: Absent. No mass.      Left: Absent. No mass.   Abdominal:      Palpations: Abdomen is soft. There is no mass.      Tenderness: There is no abdominal tenderness.   Genitourinary:     General: Normal vulva.      Labia:         Right: No rash, tenderness or lesion.         Left: No rash, tenderness or lesion.       Vagina: No vaginal discharge or erythema.      Uterus: Absent.       Adnexa:         Right: No mass or tenderness.          Left: No mass or tenderness.        Comments: Vaginal mucosa is pink / appears well estrogenized.    Anus appears wnl.  No rectal exam performed.  Lymphadenopathy:      Upper Body:      Right upper body: No supraclavicular or axillary " adenopathy.      Left upper body: No supraclavicular or axillary adenopathy.   Skin:     General: Skin is warm and dry.   Neurological:      Mental Status: She is alert and oriented to person, place, and time.   Psychiatric:         Mood and Affect: Mood normal.         Behavior: Behavior normal.         Assessment/Plan   Diagnoses and all orders for this visit:    1. Encounter for gynecological examination with abnormal finding (Primary)    2. History of breast cancer    3. Atrophy of vagina    4. Hypertension, unspecified type    Continue the Compounded Estradiol Vaginal Cream   0.1mg/gm  Si.5 g intravag at HS 2x/ week    Procedures    40 to 64: Counseling/Anticipatory Guidance Discussed: nutrition, physical activity, injury prevention, screenings and self-breast exam    Return in about 1 year (around 10/26/2023) for Annual physical.    Veronika Kc, APRN  10/26/2022

## 2023-11-09 ENCOUNTER — OFFICE VISIT (OUTPATIENT)
Dept: CARDIOLOGY | Facility: CLINIC | Age: 83
End: 2023-11-09
Payer: MEDICARE

## 2023-11-09 VITALS
OXYGEN SATURATION: 97 % | WEIGHT: 151 LBS | HEIGHT: 64 IN | SYSTOLIC BLOOD PRESSURE: 118 MMHG | BODY MASS INDEX: 25.78 KG/M2 | DIASTOLIC BLOOD PRESSURE: 72 MMHG | HEART RATE: 75 BPM

## 2023-11-09 DIAGNOSIS — I10 PRIMARY HYPERTENSION: ICD-10-CM

## 2023-11-09 DIAGNOSIS — R00.2 PALPITATIONS: Primary | ICD-10-CM

## 2023-11-09 DIAGNOSIS — R07.2 PRECORDIAL PAIN: ICD-10-CM

## 2023-11-09 PROCEDURE — 3074F SYST BP LT 130 MM HG: CPT | Performed by: INTERNAL MEDICINE

## 2023-11-09 PROCEDURE — 3078F DIAST BP <80 MM HG: CPT | Performed by: INTERNAL MEDICINE

## 2023-11-09 PROCEDURE — 99214 OFFICE O/P EST MOD 30 MIN: CPT | Performed by: INTERNAL MEDICINE

## 2023-11-09 RX ORDER — LOSARTAN POTASSIUM 25 MG/1
25 TABLET ORAL DAILY
COMMUNITY
Start: 2023-10-20

## 2023-11-09 NOTE — PROGRESS NOTES
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    PROBLEM LIST:   Hypertension.   Hyperlipidemia  8/19 194/57/182/107  VHD  5/16 echo bileaflet MVP mod mr, mild AI nl lvef  9/20/17 echo: EF 60%,  mild MR, mild AI  2021 echo ([place]) EF 65% mild + MR, mild AI  CP  10/23/2018 MPS: Lexiscan Cardiolite with moderate sized fixed apical defect with no reversibility EF>70%  12/19 ct abd no Ao or cor calcium  Palpitations 7 day holter 48/73/135 <1% PAC /PVC with no runs    4/22   Rheumatic fever as a child.  Osteoporosis  Hiatal Hernia  Hx breast ca  CP- 4/16 MPS wnl  Surgical Hx  Double mastectomy  Cataract extraction 2017 2021 hip ORIF([place]) , preop constipation , periop hypotension and PTSD      CC:   Chief Complaint   Patient presents with    Precordial pain    Palpitations       Allergies  Allergies   Allergen Reactions    Bactrim [Sulfamethoxazole-Trimethoprim] Angioedema     Swelling of tongue    Ciprofloxacin Hives    Lortab [Hydrocodone-Acetaminophen] Hives    Oxycontin [Oxycodone Hcl] Other (See Comments)     Patient states that she didn't feel well on this medication.       Current Medications    Current Outpatient Medications:     aspirin 81 MG EC tablet, Take 1 tablet by mouth Daily., Disp: , Rfl:     Calcium-Phosphorus-Vitamin D (CITRACAL +D3 PO), Take 1,000 mg by mouth daily., Disp: , Rfl:     Cranberry 500 MG capsule, Take 500 mg by mouth daily., Disp: , Rfl:     furosemide (LASIX) 20 MG tablet, Take 1 tablet by mouth As Needed., Disp: , Rfl:     losartan (COZAAR) 25 MG tablet, Take 1 tablet by mouth Daily., Disp: , Rfl:     Multiple Vitamins-Minerals (CENTRUM ADULTS PO), Take 1  "tablet by mouth Daily., Disp: , Rfl:     omeprazole (PriLOSEC) 20 MG capsule, Take 1 capsule by mouth Daily., Disp: , Rfl:     POTASSIUM PO, Take 90 mg by mouth Daily., Disp: , Rfl:     zinc gluconate 50 MG tablet, Take 1 tablet by mouth Daily. (Patient not taking: Reported on 11/9/2023), Disp: , Rfl:       History of Present Illness   Jessie Perez is a 83 y.o. year old female here for follow up.  Does note some GERD related symptoms in the a.m. and when she is able to eat breakfast resolves.  She is recently noted some hematuria and has seen Dr. Delgado  States she has felt well is doing following her close to 20 grandchildren and they are life stories.  She has multiple grandsons  including special forces.  He was able to give a hilarious anecdote about her 's need for dental care while in [place]      OBJECTIVE:  Vitals:    11/09/23 1139   BP: 118/72   BP Location: Left arm   Patient Position: Sitting   Pulse: 75   SpO2: 97%   Weight: 68.5 kg (151 lb)   Height: 161.3 cm (63.5\")       Body mass index is 26.33 kg/m².    Constitutional:       Appearance: Healthy appearance. Not in distress.   Neck:      Vascular: No JVR. JVD normal.   Pulmonary:      Effort: Pulmonary effort is normal.      Breath sounds: Normal breath sounds. No wheezing. No rhonchi. No rales.   Chest:      Chest wall: Not tender to palpatation.   Cardiovascular:      PMI at left midclavicular line. Normal rate. Regular rhythm. Normal S1. Normal S2.       Murmurs: There is no murmur.      No gallop.  No click. No rub.   Pulses:     Intact distal pulses.   Edema:     Peripheral edema absent.   Abdominal:      General: Bowel sounds are normal.      Palpations: Abdomen is soft.      Tenderness: There is no abdominal tenderness.   Musculoskeletal: Normal range of motion.         General: No tenderness. Skin:     General: Skin is warm and dry.   Neurological:      General: No focal deficit present.      Mental Status: Alert and " oriented to person, place and time.         Diagnostic Data:  Procedures      ASSESSMENT:   Diagnosis Plan   1. Palpitations        2. Precordial pain        3. Primary hypertension              PLAN:  Palpitations with benign Holter monitor    Hypertension controlled on losartan    Precordial pain with no recurrence        Rao Wei MD, FACC

## 2024-04-29 NOTE — PROGRESS NOTES
Health Maintenance       Depression Screening (Yearly)  Never done    Hepatitis B Vaccine (1 of 3 - 19+ 3-dose series)  Never done    COVID-19 Vaccine (2 - 2023-24 season)  Overdue since 9/1/2023           Following review of the above:  Patient is not proceeding with: COVID-19 and Hep B    Note: Refer to final orders and clinician documentation.         Depression screening completed.   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    Chief Complaint   Patient presents with   • Shortness of Breath   • Chest Pain       PROBLEM LIST:   1. Hypertension.   2. Hyperlipidemia.   3. VHD  1. 5/16 echo bileaflet MVP mod mr, mild AI nl lvef  4. Rheumatic fever as a child.  5. Osteoporosis  6. Hiatal Hernia  7. Hx breast ca  8. CP- 4/16 MPS wnl  9. Surgical Hx  1. Double mastectomy  2. Cataract extraction 2017    Allergies  Allergies   Allergen Reactions   • Bactrim [Sulfamethoxazole-Trimethoprim] Angioedema   • Ciprofloxacin    • Lortab [Hydrocodone-Acetaminophen]    • Oxycontin [Oxycodone Hcl]        Current Medications    Current Outpatient Prescriptions:   •  aspirin 325 MG tablet, Take 325 mg by mouth daily., Disp: , Rfl:   •  Calcium-Phosphorus-Vitamin D (CITRACAL +D3 PO), Take 1,000 mg by mouth daily., Disp: , Rfl:   •  Cranberry 500 MG capsule, Take 500 mg by mouth daily., Disp: , Rfl:   •  furosemide (LASIX) 20 MG tablet, Take 1 tablet by mouth Daily., Disp: 90 tablet, Rfl: 3  •  Multiple Vitamins-Minerals (CENTRUM ADULTS PO), Take  by mouth daily., Disp: , Rfl:   •  omeprazole (PriLOSEC) 20 MG capsule, Take 20 mg by mouth daily., Disp: , Rfl:       History of Present Illness   Pt here for follow up. Pt had cataract surgery 3 months ago, which went well. Feels she may have some stable CP that occurs sporadically, not related to exertion. Reports she checks BP sporadically. She states when she feels bad, she can check her BP and it will be 160/90+. She states that taking ASA when she has some CP with elevated BP will relieve her symptoms. 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 Nl MPS last spring. Occasional palpitations. Very active with housework/yardwork.  Pt denies any dyspnea at rest, dyspnea on exertion, orthopnea, PND, lower extremity edema, or claudication.    ROS:  All systems have been reviewed and are negative with the exception of those mentioned in the HPI.    OBJECTIVE:  Vitals:    09/27/17 0927   BP: 137/96   BP Location: Right arm   Patient Position: Sitting   Pulse: 85   Weight: 150 lb 9.6 oz (68.3 kg)   Height: 63.5\" (161.3 cm)     Physical Exam   Constitutional: She appears well-developed and well-nourished.   Neck: Normal range of motion. Neck supple. No hepatojugular reflux and no JVD present. Carotid bruit is not present. No tracheal deviation present. No thyromegaly present.   Cardiovascular: Normal rate, regular rhythm, S1 normal, S2 normal, intact distal pulses and normal pulses.  PMI is not displaced.  Exam reveals no gallop, no distant heart sounds, no friction rub, no midsystolic click and no opening snap.    Murmur heard.  Pulses:       Radial pulses are 2+ on the right side, and 2+ on the left side.        Dorsalis pedis pulses are 2+ on the right side, and 2+ on the left side.        Posterior tibial pulses are 2+ on the right side, and 2+ on the left side.   Pulmonary/Chest: Effort normal and breath sounds normal. She has no wheezes. She has no rales.   Abdominal: Soft. Bowel sounds are normal. She exhibits no mass. There is no tenderness. There is no guarding.       Diagnostic Data:  Procedures      ASSESSMENT:   Diagnosis Plan   1. Essential hypertension     2. Mixed hyperlipidemia     3. Rheumatic mitral regurgitation     4. Rheumatic aortic valve insufficiency       PLAN:  1. Acceptable today with occasional higher readings at home. Pt instructed to check BP at home more often and if readings are averaging too high then would consider addition of antihypertensive agent  2. Labs per PCP, continue risk factor reductin  3. VHD- echo " today  4. CP with no change from last eval with negative MPS 16 months ago. Will continue to monitor. Pt to let us know if symptoms worsen or occur more frequently.    Stephan Hubbard MD, thank you for referring Ms. Perez for evaluation.  I have forwarded my electronically generated recommendations to you for review.  Please do not hesitate to call with any questions.    Scribed for Rao Wei MD by Megan Luo PA-C. 9/27/2017  10:01 AM  I, Rao Wei MD, personally performed the services described in this documentation as scribed by the above named individual in my presence, and it is both accurate and complete.  9/27/2017  10:25 AM    Rao Wei MD, FACC

## 2024-08-12 ENCOUNTER — TELEPHONE (OUTPATIENT)
Dept: CARDIOLOGY | Facility: CLINIC | Age: 84
End: 2024-08-12
Payer: MEDICARE

## 2024-08-12 NOTE — TELEPHONE ENCOUNTER
Spoke with pt she was in [place] visiting when she experienced slight soa , numbness ,tingling of her lips and weakness . Pt states symptoms have since resolved. Encouraged pt to follow up with her PCP and go to the ER if these symptoms persist . Pt verbalized understanding .

## 2024-08-12 NOTE — TELEPHONE ENCOUNTER
"  Caller: Jessie Perez \"Bridgette\"    Relationship to patient: Self    Best call back number: [phone]    Chief complaint: PATIENT HAD SOB, NUMBNESS, AND WEAKNESS OVER THE WEEKEND. WAS ADVISED BY HER PCP TO FOLLOW UP WITH HER CARDIOLOGIST. PREFERS [facility] OFFICE. SAYS SHE FEELS OKAY RIGHT NOW.     Type of visit: FU    Requested date: ANY     If rescheduling, when is the original appointment: 1.9.25             "

## 2025-01-06 ENCOUNTER — TELEPHONE (OUTPATIENT)
Dept: CARDIOLOGY | Facility: CLINIC | Age: 85
End: 2025-01-06
Payer: MEDICARE

## 2025-01-06 NOTE — TELEPHONE ENCOUNTER
"    Caller: Jessie Perez \"Bridgette\"    Relationship to patient: Self    Best call back number: [phone]    Chief complaint:NO AVAILABILITY IN SCHEDULING TIME FRAME.     Type of visit: FOLLOW UP     Requested date: AS SOON AS POSSIBLE     If rescheduling, when is the original appointment: 01.09.25     Additional notes:PATIENT HAD TO CANCEL 01.09.25 APPOINTMENT WITH DR. BARLOW DUE TO DEATH IN FAMILY AND NEEDS THIS APPOINTMENT RESCHEDULED TO THE SOONEST APPOINTMENT AVAILABLE AT THE [facility]. PLEASE CONTACT PATIENT. THANK YOU.            "

## 2025-02-10 ENCOUNTER — APPOINTMENT (OUTPATIENT)
Dept: GENERAL RADIOLOGY | Facility: HOSPITAL | Age: 85
End: 2025-02-10
Payer: MEDICARE

## 2025-02-10 ENCOUNTER — APPOINTMENT (OUTPATIENT)
Dept: MRI IMAGING | Facility: HOSPITAL | Age: 85
End: 2025-02-10
Payer: MEDICARE

## 2025-02-10 ENCOUNTER — HOSPITAL ENCOUNTER (EMERGENCY)
Facility: HOSPITAL | Age: 85
Discharge: HOME | End: 2025-02-10
Attending: EMERGENCY MEDICINE | Admitting: EMERGENCY MEDICINE
Payer: MEDICARE

## 2025-02-10 VITALS
HEART RATE: 56 BPM | OXYGEN SATURATION: 98 % | WEIGHT: 151.01 LBS | BODY MASS INDEX: 25.78 KG/M2 | DIASTOLIC BLOOD PRESSURE: 62 MMHG | HEIGHT: 64 IN | SYSTOLIC BLOOD PRESSURE: 112 MMHG | RESPIRATION RATE: 18 BRPM | TEMPERATURE: 98.9 F

## 2025-02-10 DIAGNOSIS — R42 DIZZINESS: ICD-10-CM

## 2025-02-10 DIAGNOSIS — R06.02 SHORTNESS OF BREATH: ICD-10-CM

## 2025-02-10 DIAGNOSIS — R07.9 CHEST PAIN, UNSPECIFIED TYPE: Primary | ICD-10-CM

## 2025-02-10 DIAGNOSIS — R51.9 ACUTE NONINTRACTABLE HEADACHE, UNSPECIFIED HEADACHE TYPE: ICD-10-CM

## 2025-02-10 LAB
ALBUMIN SERPL-MCNC: 4 G/DL (ref 3.5–5.2)
ALBUMIN/GLOB SERPL: 1.7 G/DL
ALP SERPL-CCNC: 66 U/L (ref 39–117)
ALT SERPL W P-5'-P-CCNC: 15 U/L (ref 1–33)
ANION GAP SERPL CALCULATED.3IONS-SCNC: 11 MMOL/L (ref 5–15)
AST SERPL-CCNC: 14 U/L (ref 1–32)
BASOPHILS # BLD AUTO: 0.06 10*3/MM3 (ref 0–0.2)
BASOPHILS NFR BLD AUTO: 0.9 % (ref 0–1.5)
BILIRUB SERPL-MCNC: 0.9 MG/DL (ref 0–1.2)
BUN SERPL-MCNC: 16 MG/DL (ref 8–23)
BUN/CREAT SERPL: 21.1 (ref 7–25)
CALCIUM SPEC-SCNC: 9.4 MG/DL (ref 8.6–10.5)
CHLORIDE SERPL-SCNC: 106 MMOL/L (ref 98–107)
CO2 SERPL-SCNC: 25 MMOL/L (ref 22–29)
CREAT SERPL-MCNC: 0.76 MG/DL (ref 0.57–1)
D DIMER PPP FEU-MCNC: 0.51 MCGFEU/ML (ref 0–0.84)
DEPRECATED RDW RBC AUTO: 41.2 FL (ref 37–54)
EGFRCR SERPLBLD CKD-EPI 2021: 77.4 ML/MIN/1.73
EOSINOPHIL # BLD AUTO: 0.17 10*3/MM3 (ref 0–0.4)
EOSINOPHIL NFR BLD AUTO: 2.4 % (ref 0.3–6.2)
ERYTHROCYTE [DISTWIDTH] IN BLOOD BY AUTOMATED COUNT: 13.1 % (ref 12.3–15.4)
FLUAV RNA RESP QL NAA+PROBE: NOT DETECTED
FLUBV RNA RESP QL NAA+PROBE: NOT DETECTED
GEN 5 1HR TROPONIN T REFLEX: 12 NG/L
GLOBULIN UR ELPH-MCNC: 2.3 GM/DL
GLUCOSE BLDC GLUCOMTR-MCNC: 120 MG/DL (ref 70–130)
GLUCOSE SERPL-MCNC: 137 MG/DL (ref 65–99)
HCT VFR BLD AUTO: 39 % (ref 34–46.6)
HGB BLD-MCNC: 12.8 G/DL (ref 12–15.9)
HOLD SPECIMEN: NORMAL
IMM GRANULOCYTES # BLD AUTO: 0.01 10*3/MM3 (ref 0–0.05)
IMM GRANULOCYTES NFR BLD AUTO: 0.1 % (ref 0–0.5)
LYMPHOCYTES # BLD AUTO: 2.49 10*3/MM3 (ref 0.7–3.1)
LYMPHOCYTES NFR BLD AUTO: 35.9 % (ref 19.6–45.3)
MCH RBC QN AUTO: 28.4 PG (ref 26.6–33)
MCHC RBC AUTO-ENTMCNC: 32.8 G/DL (ref 31.5–35.7)
MCV RBC AUTO: 86.7 FL (ref 79–97)
MONOCYTES # BLD AUTO: 0.46 10*3/MM3 (ref 0.1–0.9)
MONOCYTES NFR BLD AUTO: 6.6 % (ref 5–12)
NEUTROPHILS NFR BLD AUTO: 3.75 10*3/MM3 (ref 1.7–7)
NEUTROPHILS NFR BLD AUTO: 54.1 % (ref 42.7–76)
NRBC BLD AUTO-RTO: 0 /100 WBC (ref 0–0.2)
NT-PROBNP SERPL-MCNC: 89.5 PG/ML (ref 0–1800)
PLATELET # BLD AUTO: 239 10*3/MM3 (ref 140–450)
PMV BLD AUTO: 11 FL (ref 6–12)
POTASSIUM SERPL-SCNC: 3.9 MMOL/L (ref 3.5–5.2)
PROT SERPL-MCNC: 6.3 G/DL (ref 6–8.5)
RBC # BLD AUTO: 4.5 10*6/MM3 (ref 3.77–5.28)
SARS-COV-2 RNA RESP QL NAA+PROBE: NOT DETECTED
SODIUM SERPL-SCNC: 142 MMOL/L (ref 136–145)
TROPONIN T NUMERIC DELTA: 1 NG/L
TROPONIN T SERPL HS-MCNC: 11 NG/L
WBC NRBC COR # BLD AUTO: 6.94 10*3/MM3 (ref 3.4–10.8)
WHOLE BLOOD HOLD COAG: NORMAL
WHOLE BLOOD HOLD SPECIMEN: NORMAL

## 2025-02-10 PROCEDURE — 82948 REAGENT STRIP/BLOOD GLUCOSE: CPT

## 2025-02-10 PROCEDURE — 85025 COMPLETE CBC W/AUTO DIFF WBC: CPT | Performed by: EMERGENCY MEDICINE

## 2025-02-10 PROCEDURE — 85379 FIBRIN DEGRADATION QUANT: CPT | Performed by: EMERGENCY MEDICINE

## 2025-02-10 PROCEDURE — 99284 EMERGENCY DEPT VISIT MOD MDM: CPT

## 2025-02-10 PROCEDURE — 93005 ELECTROCARDIOGRAM TRACING: CPT | Performed by: EMERGENCY MEDICINE

## 2025-02-10 PROCEDURE — 70551 MRI BRAIN STEM W/O DYE: CPT

## 2025-02-10 PROCEDURE — 96374 THER/PROPH/DIAG INJ IV PUSH: CPT

## 2025-02-10 PROCEDURE — 93005 ELECTROCARDIOGRAM TRACING: CPT

## 2025-02-10 PROCEDURE — 87636 SARSCOV2 & INF A&B AMP PRB: CPT | Performed by: EMERGENCY MEDICINE

## 2025-02-10 PROCEDURE — 83880 ASSAY OF NATRIURETIC PEPTIDE: CPT | Performed by: EMERGENCY MEDICINE

## 2025-02-10 PROCEDURE — 80053 COMPREHEN METABOLIC PANEL: CPT | Performed by: EMERGENCY MEDICINE

## 2025-02-10 PROCEDURE — 84484 ASSAY OF TROPONIN QUANT: CPT | Performed by: EMERGENCY MEDICINE

## 2025-02-10 PROCEDURE — 36415 COLL VENOUS BLD VENIPUNCTURE: CPT

## 2025-02-10 PROCEDURE — 25010000002 KETOROLAC TROMETHAMINE PER 15 MG: Performed by: EMERGENCY MEDICINE

## 2025-02-10 PROCEDURE — 71045 X-RAY EXAM CHEST 1 VIEW: CPT

## 2025-02-10 RX ORDER — SODIUM CHLORIDE 0.9 % (FLUSH) 0.9 %
10 SYRINGE (ML) INJECTION AS NEEDED
Status: DISCONTINUED | OUTPATIENT
Start: 2025-02-10 | End: 2025-02-10 | Stop reason: HOSPADM

## 2025-02-10 RX ORDER — NITROGLYCERIN 0.4 MG/1
0.4 TABLET SUBLINGUAL
Status: DISCONTINUED | OUTPATIENT
Start: 2025-02-10 | End: 2025-02-10 | Stop reason: HOSPADM

## 2025-02-10 RX ORDER — KETOROLAC TROMETHAMINE 15 MG/ML
15 INJECTION, SOLUTION INTRAMUSCULAR; INTRAVENOUS ONCE
Status: COMPLETED | OUTPATIENT
Start: 2025-02-10 | End: 2025-02-10

## 2025-02-10 RX ADMIN — NITROGLYCERIN 0.4 MG: 0.4 TABLET, ORALLY DISINTEGRATING SUBLINGUAL at 09:58

## 2025-02-10 RX ADMIN — KETOROLAC TROMETHAMINE 15 MG: 15 INJECTION, SOLUTION INTRAMUSCULAR; INTRAVENOUS at 10:45

## 2025-02-10 NOTE — ED PROVIDER NOTES
Subjective   History of Present Illness  Mrs Perez arrives from home via EMS with multiple complaints.  She tells me at 4:00 this morning she was awakened with headache and dizziness.  Worse with head movement.  She tells me she would fall to the left when she would try to walk.  Also at that time developed shortness of breath, tightness in her chest.  The shortness of breath, dizziness, and headache have resolved.  She tells me her gait improved as well.  Still has pain in her chest.  Reports history of heart murmur.  She has history of Ménière's disease but tells me she has not had a dizzy spell for many years.  Denies recent fever or upper respiratory symptoms      Review of Systems    Past Medical History:   Diagnosis Date    C. difficile diarrhea     Chest pain 09/07/2016    Hip fracture     History of echocardiogram     EKG done today shows normal sinus rhythm with no ST-T wave changes with a rate of 61 and a QTc of 404 msec.     History of rheumatic fever 09/07/2016    Hyperlipidemia 09/07/2016    Hypertension 09/07/2016    Menopause     Mitral regurgitation 09/07/2016    Swelling of extremity, left 09/07/2016       Allergies   Allergen Reactions    Bactrim [Sulfamethoxazole-Trimethoprim] Angioedema     Swelling of tongue    Ciprofloxacin Hives    Codeine Hives    Lortab [Hydrocodone-Acetaminophen] Hives    Oxycontin [Oxycodone Hcl] Other (See Comments)     Patient states that she didn't feel well on this medication.       Past Surgical History:   Procedure Laterality Date    BASAL CELL CARCINOMA EXCISION  04/2023    BREAST BIOPSY      CARDIAC CATHETERIZATION      HIP FRACTURE SURGERY Left     TOTAL HIP ARTHROPLASTY Right     TUBAL ABDOMINAL LIGATION      VAGINAL HYSTERECTOMY         Family History   Problem Relation Age of Onset    Hypertension Mother     Stroke Mother         5 strokes    Cancer Father     Stroke Father        Social History     Socioeconomic History    Marital status:    Tobacco  Use    Smoking status: Never    Smokeless tobacco: Never   Vaping Use    Vaping status: Never Used   Substance and Sexual Activity    Alcohol use: No    Drug use: No    Sexual activity: Defer     Birth control/protection: Post-menopausal           Objective   Physical Exam  Vitals and nursing note reviewed.   Constitutional:       General: She is not in acute distress.     Appearance: Normal appearance.   HENT:      Head: Normocephalic and atraumatic.      Nose: Nose normal. No congestion or rhinorrhea.   Eyes:      General: No scleral icterus.     Conjunctiva/sclera: Conjunctivae normal.   Neck:      Comments: No JVD   Cardiovascular:      Rate and Rhythm: Normal rate and regular rhythm.      Heart sounds: No murmur heard.     No friction rub.   Pulmonary:      Effort: Pulmonary effort is normal.      Breath sounds: Normal breath sounds. No wheezing or rales.   Abdominal:      General: Bowel sounds are normal.      Palpations: Abdomen is soft.      Tenderness: There is no abdominal tenderness. There is no guarding or rebound.   Musculoskeletal:         General: No tenderness.      Cervical back: Normal range of motion and neck supple.      Right lower leg: No edema.      Left lower leg: No edema.   Skin:     General: Skin is warm and dry.      Coloration: Skin is not pale.      Findings: No erythema.   Neurological:      General: No focal deficit present.      Mental Status: She is alert and oriented to person, place, and time.      Motor: No weakness.      Coordination: Coordination normal.   Psychiatric:         Mood and Affect: Mood normal.         Behavior: Behavior normal.         Thought Content: Thought content normal.         Procedures           ED Course  ED Course as of 02/10/25 1516   Mon Feb 10, 2025   1033 Reporting nitroglycerin did not help any with her chest pressure.  Will give Toradol.  Troponin is low.  D-dimer low enough to rule out pulmonary embolism. [DT]   1211 Is comfortable and feels  better.  Spoke with her about findings.  Due to her headache, dizziness, falling to the left will obtain MRI to evaluate for stroke. [DT]      ED Course User Index  [DT] Dennis Mcclellan MD                  HEART Score: 3                                      Medical Decision Making  Evaluated multiple life-threatening complaints including chest pain, shortness of breath, difficulty walking.  Ordered and interpreted multiple labs.  Reviewed and interpreted prior records.  Ordered and interpreted results of MRI.  Had multiple reevaluations.    Problems Addressed:  Acute nonintractable headache, unspecified headache type: complicated acute illness or injury that poses a threat to life or bodily functions  Chest pain, unspecified type: complicated acute illness or injury that poses a threat to life or bodily functions  Dizziness: complicated acute illness or injury that poses a threat to life or bodily functions  Shortness of breath: complicated acute illness or injury that poses a threat to life or bodily functions    Amount and/or Complexity of Data Reviewed  External Data Reviewed: notes.  Labs: ordered. Decision-making details documented in ED Course.  Radiology: ordered. Decision-making details documented in ED Course.  ECG/medicine tests: ordered. Decision-making details documented in ED Course.    Risk  Prescription drug management.        Final diagnoses:   Acute nonintractable headache, unspecified headache type   Chest pain, unspecified type   Shortness of breath   Dizziness       ED Disposition  ED Disposition       ED Disposition   Discharge    Condition   Stable    Comment   --               Stephan Hubbard MD  [facility] [address]  [phone]               Medication List      No changes were made to your prescriptions during this visit.            Dennis Mcclellan MD  02/10/25 3002

## 2025-02-13 LAB
QT INTERVAL: 386 MS
QTC INTERVAL: 431 MS

## 2025-03-13 ENCOUNTER — TELEPHONE (OUTPATIENT)
Dept: CARDIOLOGY | Facility: CLINIC | Age: 85
End: 2025-03-13

## 2025-03-13 ENCOUNTER — OFFICE VISIT (OUTPATIENT)
Dept: CARDIOLOGY | Facility: CLINIC | Age: 85
End: 2025-03-13
Payer: MEDICARE

## 2025-03-13 VITALS
HEIGHT: 64 IN | HEART RATE: 80 BPM | BODY MASS INDEX: 25.1 KG/M2 | OXYGEN SATURATION: 98 % | DIASTOLIC BLOOD PRESSURE: 68 MMHG | SYSTOLIC BLOOD PRESSURE: 132 MMHG | WEIGHT: 147 LBS

## 2025-03-13 DIAGNOSIS — R00.2 PALPITATIONS: Primary | ICD-10-CM

## 2025-03-13 DIAGNOSIS — I10 PRIMARY HYPERTENSION: ICD-10-CM

## 2025-03-13 PROCEDURE — 3075F SYST BP GE 130 - 139MM HG: CPT | Performed by: INTERNAL MEDICINE

## 2025-03-13 PROCEDURE — 3078F DIAST BP <80 MM HG: CPT | Performed by: INTERNAL MEDICINE

## 2025-03-13 PROCEDURE — 99214 OFFICE O/P EST MOD 30 MIN: CPT | Performed by: INTERNAL MEDICINE

## 2025-03-13 NOTE — PROGRESS NOTES
[facility] Cardiology  Office Progress Note  Jessie Perez  1940  [address] [facility] [address]       Visit Date: 03/13/25    PCP: Stephan Hubbard MD  [address]   [facility] [address]    IDENTIFICATION: A 84 y.o.  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    PROBLEM LIST:   Hypertension.   Hyperlipidemia  8/19 194/57/182/107  VHD  5/16 echo bileaflet MVP mod mr, mild AI nl lvef  9/20/17 echo: EF 60%,  mild MR, mild AI  2021 echo ([facility]) EF 65% mild + MR, mild AI  CP  10/23/2018 MPS: Lexiscan Cardiolite with moderate sized fixed apical defect with no reversibility EF>70%  12/19 ct abd no Ao or cor calcium  Palpitations 7 day holter 48/73/135 <1% PAC /PVC with no runs    4/22   Rheumatic fever as a child.  Osteoporosis  Hiatal Hernia  Hx breast ca  CP- 4/16 MPS wnl  Surgical Hx  Double mastectomy  Cataract extraction 2017 2021 hip ORIF([place]) , preop constipation , periop hypotension and PTSD      CC:   Chief Complaint   Patient presents with    Palpitations    Shortness of Breath     Patient stated that she is now having SOB when she wakes up in the morning       Allergies  Allergies   Allergen Reactions    Bactrim [Sulfamethoxazole-Trimethoprim] Angioedema     Swelling of tongue    Ciprofloxacin Hives    Codeine Hives    Lortab [Hydrocodone-Acetaminophen] Hives    Oxycontin [Oxycodone Hcl] Other (See Comments)     Patient states that she didn't feel well on this medication.       Current Medications    Current Outpatient Medications:     Apoaequorin (PREVAGEN PO), Take  by mouth., Disp: , Rfl:     aspirin 81 MG EC tablet, Take 1 tablet by mouth Daily., Disp: , Rfl:     Calcium-Phosphorus-Vitamin D (CITRACAL +D3 PO), Take 1,000 mg by mouth daily., Disp: , Rfl:     Cranberry 500 MG capsule, Take 500 mg by mouth daily., Disp: , Rfl:     furosemide (LASIX) 20 MG tablet, Take 1 tablet by mouth  "As Needed., Disp: , Rfl:     losartan (COZAAR) 50 MG tablet, Take 1 tablet by mouth Daily., Disp: , Rfl:     Multiple Vitamins-Minerals (CENTRUM ADULTS PO), Take 1 tablet by mouth Daily., Disp: , Rfl:     omeprazole (PriLOSEC) 20 MG capsule, Take 1 capsule by mouth Daily., Disp: , Rfl:       History of Present Illness   Jessie Perez is a 84 y.o. year old female here for er follow up.  Recently went to the ER stated she had a headache that was unrelenting.  She had serologies and a brain MRI that were benign.  She states that she has issues with not feeling herself and having morning blood pressure issues    OBJECTIVE:  Vitals:    03/13/25 1143   BP: 132/68   BP Location: Right arm   Patient Position: Sitting   Cuff Size: Adult   Pulse: 80   SpO2: 98%   Weight: 66.7 kg (147 lb)   Height: 161.3 cm (63.5\")         Body mass index is 25.63 kg/m².    Constitutional:       Appearance: Healthy appearance. Not in distress.   Neck:      Vascular: No JVR. JVD normal.   Pulmonary:      Effort: Pulmonary effort is normal.      Breath sounds: Normal breath sounds. No wheezing. No rhonchi. No rales.   Chest:      Chest wall: Not tender to palpatation.   Cardiovascular:      PMI at left midclavicular line. Normal rate. Regular rhythm. Normal S1. Normal S2.       Murmurs: There is no murmur.      No gallop.  No click. No rub.   Pulses:     Intact distal pulses.   Edema:     Peripheral edema absent.   Abdominal:      General: Bowel sounds are normal.      Palpations: Abdomen is soft.      Tenderness: There is no abdominal tenderness.   Musculoskeletal: Normal range of motion.         General: No tenderness. Skin:     General: Skin is warm and dry.   Neurological:      General: No focal deficit present.      Mental Status: Alert and oriented to person, place and time.         Diagnostic Data:  Procedures      ASSESSMENT:   Diagnosis Plan   1. Palpitations        2. Primary hypertension                PLAN:  Palpitations " reevaluate Holter    Hypertension controlled on losartan    With element of a.m. headache and hypertension will screen for sleep apnea/not internal hypoxia with overnight oximetry        Rao Wei MD, FACC

## 2025-03-24 ENCOUNTER — TELEPHONE (OUTPATIENT)
Dept: CARDIOLOGY | Facility: CLINIC | Age: 85
End: 2025-03-24
Payer: MEDICARE

## 2025-03-24 NOTE — TELEPHONE ENCOUNTER
FYI:    Per Jono patient's monitor was canceled due to not being able to analyze the data because of artifact.

## 2025-03-25 DIAGNOSIS — R00.2 PALPITATIONS: Primary | ICD-10-CM

## 2025-03-25 NOTE — TELEPHONE ENCOUNTER
Pt agreeable to another Holter ,  son is a paramedic and can place it on her. Holter dept aware.  Also states [facility] has mailed overnight pulse oximetry kit as well.

## 2025-04-03 ENCOUNTER — TELEPHONE (OUTPATIENT)
Dept: CARDIOLOGY | Facility: CLINIC | Age: 85
End: 2025-04-03
Payer: MEDICARE

## 2025-04-03 NOTE — TELEPHONE ENCOUNTER
Rao Wei MD Jackson, Kristina, RN  Overnight oximetry WNL    Pt called and informed of the above results, verbalized understanding.

## 2025-04-11 ENCOUNTER — TRANSCRIBE ORDERS (OUTPATIENT)
Dept: ADMINISTRATIVE | Facility: HOSPITAL | Age: 85
End: 2025-04-11
Payer: MEDICARE

## 2025-04-11 DIAGNOSIS — E04.2 NONTOXIC MULTINODULAR GOITER: Primary | ICD-10-CM

## 2025-04-14 ENCOUNTER — TELEPHONE (OUTPATIENT)
Dept: CARDIOLOGY | Facility: CLINIC | Age: 85
End: 2025-04-14

## 2025-04-14 LAB
CV ZIO BASELINE AVG BPM: 70
CV ZIO BASELINE BPM HIGH: 148
CV ZIO BASELINE BPM LOW: 46

## 2025-04-14 NOTE — TELEPHONE ENCOUNTER
Rao Wei MD Jackson, Kristina, SALTY  Holter wnl  <1% pac/pvc    Pt called and informed of the above results, verbalized understanding.

## 2025-04-16 ENCOUNTER — TELEPHONE (OUTPATIENT)
Dept: ENDOCRINOLOGY | Facility: CLINIC | Age: 85
End: 2025-04-16

## 2025-04-16 ENCOUNTER — OFFICE VISIT (OUTPATIENT)
Dept: ENDOCRINOLOGY | Facility: CLINIC | Age: 85
End: 2025-04-16
Payer: MEDICARE

## 2025-04-16 VITALS
OXYGEN SATURATION: 98 % | HEIGHT: 63 IN | HEART RATE: 82 BPM | DIASTOLIC BLOOD PRESSURE: 70 MMHG | BODY MASS INDEX: 25.87 KG/M2 | WEIGHT: 146 LBS | SYSTOLIC BLOOD PRESSURE: 120 MMHG

## 2025-04-16 DIAGNOSIS — E04.9 GOITER: Primary | ICD-10-CM

## 2025-04-16 PROCEDURE — 3074F SYST BP LT 130 MM HG: CPT | Performed by: INTERNAL MEDICINE

## 2025-04-16 PROCEDURE — 3078F DIAST BP <80 MM HG: CPT | Performed by: INTERNAL MEDICINE

## 2025-04-16 PROCEDURE — 1160F RVW MEDS BY RX/DR IN RCRD: CPT | Performed by: INTERNAL MEDICINE

## 2025-04-16 PROCEDURE — 1159F MED LIST DOCD IN RCRD: CPT | Performed by: INTERNAL MEDICINE

## 2025-04-16 PROCEDURE — 99203 OFFICE O/P NEW LOW 30 MIN: CPT | Performed by: INTERNAL MEDICINE

## 2025-04-16 RX ORDER — METRONIDAZOLE 500 MG/1
TABLET ORAL
COMMUNITY
Start: 2025-04-14

## 2025-04-16 NOTE — ASSESSMENT & PLAN NOTE
She reports h/o goiter.  She reports h/o benign FNA in the past.  She has thyroid asymmetry on exam today with enlarged left thyroid lobe.  She has been euthyroid.  Recent TSH was done - we are trying to track down results.  She had neck u/s done about 6 months ago that didn't show any thyroid nodules.  Plan for another neck u/s in about 6 months.

## 2025-04-16 NOTE — PROGRESS NOTES
"     Office Note      Date: 2025  Patient Name: Jessie Perez  MRN: [id number]  : 1940    Chief Complaint   Patient presents with    Goiter       History of Present Illness:   Jessie Perez is a 84 y.o. female who presents for Goiter    She reports h/o goiter since .  She was told she had 2 nodules she had FNA at that time that was reportedly benign.  She had neck u/s done 2024.  This showed homogeneous thyroid with no nodules.  No measurement was given of size of the thyroid though.  She isn't taking any thyroid meds.  She had labs done 3/2025.  The free T4 was normal.  TSH was done but we haven't been able to get results of this.  She has noted some discomfort in her neck when turning her neck to the left.  She notes occ raspiness of her voice.  She denies any dysphagia.  She denies any sxs of hypo- or hyperthyroidism at this time.    Subjective      Patient was born where: [place].  Facial radiation exposure: No.  High iodine intake: No  Family hx of thyroid disease: Yes, describe: sister.    Review of Systems:   Review of Systems   Constitutional: Negative.    HENT: Negative.     Eyes: Negative.    Respiratory:  Positive for shortness of breath.    Cardiovascular: Negative.    Gastrointestinal: Negative.    Endocrine: Negative.    Genitourinary:  Positive for pelvic pain.   Musculoskeletal: Negative.    Skin: Negative.         Hair Loss   Allergic/Immunologic: Negative.    Neurological:  Positive for syncope.   Hematological: Negative.    Psychiatric/Behavioral: Negative.         The following portions of the patient's history were reviewed and updated as appropriate: allergies, current medications, past family history, past medical history, past social history, past surgical history, and problem list.    Objective     Visit Vitals  /70   Pulse 82   Ht 160 cm (63\")   Wt 66.2 kg (146 lb)   SpO2 98%   BMI 25.86 kg/m²       Physical Exam:  Physical Exam  Constitutional:       " "Appearance: Normal appearance.   HENT:      Head: Normocephalic and atraumatic.   Eyes:      Extraocular Movements: Extraocular movements intact.      Conjunctiva/sclera: Conjunctivae normal.   Cardiovascular:      Rate and Rhythm: Normal rate and regular rhythm.      Pulses: Normal pulses.      Heart sounds: Normal heart sounds.   Pulmonary:      Effort: Pulmonary effort is normal.      Breath sounds: Normal breath sounds.   Abdominal:      General: Bowel sounds are normal.      Palpations: Abdomen is soft.   Musculoskeletal:         General: Normal range of motion.      Cervical back: Normal range of motion and neck supple.   Skin:     General: Skin is warm and dry.   Neurological:      General: No focal deficit present.      Mental Status: She is alert.   Psychiatric:         Mood and Affect: Mood normal.         Behavior: Behavior normal.         Thought Content: Thought content normal.         Judgment: Judgment normal.         Labs:    TSH  No results found for: \"TSHBASE\"     Free T4  No results found for: \"FREET4\"    T3  No results found for: \"W5VYCCB\"      TPO  No results found for: \"THYROIDAB\"    TG AB  No results found for: \"THGAB\"    TG  No results found for: \"THYROGLB\"    CBC w/DIFF  Lab Results   Component Value Date    WBC 6.94 02/10/2025    RBC 4.50 02/10/2025    HGB 12.8 02/10/2025    HCT 39.0 02/10/2025    MCV 86.7 02/10/2025    MCH 28.4 02/10/2025    MCHC 32.8 02/10/2025    RDW 13.1 02/10/2025    RDWSD 41.2 02/10/2025    MPV 11.0 02/10/2025     02/10/2025    NEUTRORELPCT 54.1 02/10/2025    LYMPHORELPCT 35.9 02/10/2025    MONORELPCT 6.6 02/10/2025    EOSRELPCT 2.4 02/10/2025    BASORELPCT 0.9 02/10/2025    AUTOIGPER 0.1 02/10/2025    NEUTROABS 3.75 02/10/2025    LYMPHSABS 2.49 02/10/2025    MONOSABS 0.46 02/10/2025    EOSABS 0.17 02/10/2025    BASOSABS 0.06 02/10/2025    AUTOIGNUM 0.01 02/10/2025    NRBC 0.0 02/10/2025           Assessment / Plan      Assessment & Plan:  Diagnoses and all " orders for this visit:    1. Goiter (Primary)  Assessment & Plan:  She reports h/o goiter.  She reports h/o benign FNA in the past.  She has thyroid asymmetry on exam today with enlarged left thyroid lobe.  She has been euthyroid.  Recent TSH was done - we are trying to track down results.  She had neck u/s done about 6 months ago that didn't show any thyroid nodules.  Plan for another neck u/s in about 6 months.           Current Outpatient Medications   Medication Instructions    Apoaequorin (PREVAGEN PO) Take  by mouth.    aspirin 81 mg, Daily    Calcium-Phosphorus-Vitamin D (CITRACAL +D3 PO) 1,000 mg, Daily    Cranberry 500 mg, Daily    furosemide (LASIX) 20 mg, As Needed    losartan (COZAAR) 50 mg, Daily    metroNIDAZOLE (FLAGYL) 500 MG tablet     Multiple Vitamins-Minerals (CENTRUM ADULTS PO) 1 tablet, Daily    omeprazole (PRILOSEC) 20 mg, Daily      Return in about 6 months (around 10/16/2025) for Recheck with TSH, neck u/s.    Electronically signed by: Itz Best MD  04/16/2025

## 2025-06-24 ENCOUNTER — TELEPHONE (OUTPATIENT)
Dept: OBSTETRICS AND GYNECOLOGY | Facility: CLINIC | Age: 85
End: 2025-06-24
Payer: MEDICARE

## 2025-06-24 NOTE — TELEPHONE ENCOUNTER
PT HASN'T BEEN SEEN SINCE 2022-- [facility] CALLING ASKING FOR A REFILL OF ESTRADIOL VAG CREAM

## 2025-06-24 NOTE — TELEPHONE ENCOUNTER
Prescription sent to preferred pharmacy   Pt aware and states that she is about to go on vacation and doesn't know how long she will be gone but when she comes back she will call and schedule an appt to be seen.  Branden at [facility] made aware.